# Patient Record
Sex: FEMALE | Race: WHITE | NOT HISPANIC OR LATINO | Employment: OTHER | URBAN - METROPOLITAN AREA
[De-identification: names, ages, dates, MRNs, and addresses within clinical notes are randomized per-mention and may not be internally consistent; named-entity substitution may affect disease eponyms.]

---

## 2017-07-31 DIAGNOSIS — I48.19 PERSISTENT ATRIAL FIBRILLATION (HCC): ICD-10-CM

## 2017-07-31 RX ORDER — APIXABAN 2.5 MG/1
TABLET, FILM COATED ORAL
Qty: 180 TABLET | Refills: 0 | Status: SHIPPED | OUTPATIENT
Start: 2017-07-31 | End: 2017-10-30 | Stop reason: SDUPTHER

## 2017-10-14 DIAGNOSIS — I48.19 PERSISTENT ATRIAL FIBRILLATION (HCC): ICD-10-CM

## 2017-10-16 RX ORDER — DRONEDARONE 400 MG/1
TABLET, FILM COATED ORAL
Qty: 180 TABLET | Refills: 2 | OUTPATIENT
Start: 2017-10-16

## 2017-10-18 DIAGNOSIS — I48.19 PERSISTENT ATRIAL FIBRILLATION (HCC): ICD-10-CM

## 2017-10-18 RX ORDER — DRONEDARONE 400 MG/1
TABLET, FILM COATED ORAL
Qty: 180 TABLET | Refills: 2 | OUTPATIENT
Start: 2017-10-18

## 2017-10-30 DIAGNOSIS — I48.19 PERSISTENT ATRIAL FIBRILLATION (HCC): ICD-10-CM

## 2017-10-30 RX ORDER — APIXABAN 2.5 MG/1
TABLET, FILM COATED ORAL
Qty: 90 TABLET | Refills: 0 | Status: SHIPPED | OUTPATIENT
Start: 2017-10-30 | End: 2017-12-14 | Stop reason: SDUPTHER

## 2017-12-14 DIAGNOSIS — I48.19 PERSISTENT ATRIAL FIBRILLATION (HCC): ICD-10-CM

## 2017-12-14 RX ORDER — APIXABAN 2.5 MG/1
TABLET, FILM COATED ORAL
Qty: 90 TABLET | Refills: 0 | Status: SHIPPED | OUTPATIENT
Start: 2017-12-14 | End: 2018-01-23

## 2018-01-23 ENCOUNTER — OFFICE VISIT (OUTPATIENT)
Dept: CARDIOLOGY | Facility: CLINIC | Age: 83
End: 2018-01-23

## 2018-01-23 VITALS
DIASTOLIC BLOOD PRESSURE: 92 MMHG | BODY MASS INDEX: 29.78 KG/M2 | SYSTOLIC BLOOD PRESSURE: 176 MMHG | HEART RATE: 83 BPM | WEIGHT: 189.7 LBS | HEIGHT: 67 IN

## 2018-01-23 DIAGNOSIS — I10 ESSENTIAL HYPERTENSION: ICD-10-CM

## 2018-01-23 DIAGNOSIS — I48.19 PERSISTENT ATRIAL FIBRILLATION (HCC): Primary | ICD-10-CM

## 2018-01-23 PROCEDURE — 99213 OFFICE O/P EST LOW 20 MIN: CPT | Performed by: INTERNAL MEDICINE

## 2018-01-23 PROCEDURE — 93000 ELECTROCARDIOGRAM COMPLETE: CPT | Performed by: INTERNAL MEDICINE

## 2018-01-23 RX ORDER — METOPROLOL TARTRATE 50 MG/1
50 TABLET, FILM COATED ORAL EVERY 12 HOURS SCHEDULED
Qty: 180 TABLET | Refills: 3 | Status: SHIPPED | OUTPATIENT
Start: 2018-01-23 | End: 2022-12-07 | Stop reason: HOSPADM

## 2018-01-23 NOTE — PROGRESS NOTES
[facility]  Office visit  Ranjana Hatfield  9/25/1933  [phone]    VISIT DATE:  01/23/2018    PCP: Alpesh Perez, DO  [address] [facility] [address]    CC:  Chief Complaint   Patient presents with   • Atrial Fibrillation       PROBLEM LIST:  1. Paroxysmal atrial fibrillation:  a. CHADS2 equal to 2. CHADS-VASc=3.  b. NITHYA discloses normal LV function with LV hypertrophy, LVEF 60%, mild aortic regurgitation, mild to moderate MR, trivial small PFO.  c. Successful synchronized ECV using biphasic energy, 02/13/2007.  d. Electrical cardioversion, 06/17/2013:  i. Initiated antiarrhythmic therapy, Multaq.  ii. Initiated prophylactic anticoagulation therapy, Xarelto.  iii. Xarelto discontinued secondary to anemia, 01/10/2015.   iv. Eliquis initiated 05/19/2015.   e. Nuclear stress test shows no evidence of ischemia/infarction, LVEF 73%.   f. ECV 8/1/2016 (Dr Fisher), unsuccessful after 2 shocks, Follow-up EKG in November 2016 revealed a wandering atrial pacemaker.  g. EKG January 2018: Rate controlled atrial fibrillation  2. Hypertension.  3. Pulmonary emboli, 2010:  a. Warfarin therapy- dc'd when Eliquis initiated  b. IVC filter, data deficit.   4. Hypothyroidism.  5. Anemia of chronic disease:  a. Secondary to chronic renal insufficiency.  6. Internal hemorrhoids.  7. Mild chronic renal insufficiency.  8. Status post surgery remote:                                                                     a. Lower back surgery (data deficit), 2010.                                                                    b. Right cataract extraction and lens implantation (December 2015).     ASSESSMENT:   Diagnosis Plan   1. Persistent atrial fibrillation  Holter Monitor - 24 Hour   2. Essential hypertension         PLAN:  Atrial fibrillation: It appears she has made the transition to permanent asymptomatic atrial fibrillation.  Discontinuing multaq.  Charting metoprolol tartrate 50 mg  "by mouth twice a day.  Follow-up Holter monitor in one to 2 weeks to verify adequate rate control.  Continue Eliquis for stroke prophylaxis, 5 mg by mouth twice a day.    Hypertension: Ideal goal less than 130/80 mmHg.  Continue current medical therapy.    Subjective  Reports stable functional capacity.  Using a cane for ambulation.  She denies chest pain, palpitations or limiting dyspnea on exertion.  Blood pressures at home are running in the 140-145 over less than 80 mmHg range.  She is compliant with medical therapy.  Denies easy bruising or bleeding complications on Eliquis.  Compliant with medical therapy.  Twelve-lead EKG revealed rate controlled atrial fibrillation in the office today.    PHYSICAL EXAMINATION:  Vitals:    01/23/18 1015   BP: 176/92   BP Location: Left arm   Patient Position: Sitting   Pulse: 83   Weight: 86 kg (189 lb 11.2 oz)   Height: 170.2 cm (67\")     General Appearance:    Alert, cooperative, no distress, appears stated age   Head:    Normocephalic, without obvious abnormality, atraumatic   Eyes:    conjunctiva/corneas clear   Nose:   Nares normal, septum midline, mucosa normal, no drainage   Throat:   Lips, teeth and gums normal   Neck:   Supple, symmetrical, trachea midline, no carotid    bruit or JVD   Lungs:     Clear to auscultation bilaterally, respirations unlabored   Chest Wall:    No tenderness or deformity    Heart:    Irregularly irregular, S1 and S2 normal, no murmur, rub   or gallop, normal carotid impulse bilaterally without bruit.   Abdomen:     Soft, non-tender   Extremities:   Extremities normal, atraumatic, no cyanosis or edema   Pulses:   2+ and symmetric all extremities   Skin:   Skin color, texture, turgor normal, no rashes or lesions       Diagnostic Data:    ECG 12 Lead  Date/Time: 1/23/2018 10:26 AM  Performed by: MARYCHUY BROOKS III  Authorized by: MARYCHUY BROOKS III   Comparison: compared with previous ECG   Comparison to previous ECG: Atrial fibrillation has " replaced wandering atrial pacemaker  Rhythm: atrial fibrillation  Clinical impression: abnormal ECG          Lab Results   Component Value Date    TRIG 67 08/01/2016    HDL 61 (H) 08/01/2016    LDLDIRECT 81 08/01/2016     Lab Results   Component Value Date    GLUCOSE 90 08/01/2016    BUN 15 08/01/2016    CREATININE 1.20 08/01/2016     08/01/2016    K 4.1 08/01/2016     08/01/2016    CO2 28.0 08/01/2016     No results found for: HGBA1C  Lab Results   Component Value Date    WBC 4.09 08/01/2016    HGB 12.1 08/01/2016    HCT 37.7 08/01/2016     08/01/2016       Allergies  Allergies   Allergen Reactions   • Lipitor [Atorvastatin]      myalgias   • Nsaids      gastritis   • Xarelto [Rivaroxaban]      anemia       Current Medications    Current Outpatient Prescriptions:   •  cholecalciferol (VITAMIN D3) 1000 UNITS tablet, Take 1,000 Units by mouth daily., Disp: , Rfl:   •  Ferrous Sulfate (SLOW FE PO), Take 1 tablet by mouth daily., Disp: , Rfl:   •  furosemide (LASIX) 20 MG tablet, Take 20 mg by mouth daily., Disp: , Rfl:   •  gabapentin (NEURONTIN) 100 MG capsule, Take 100 mg by mouth 3 (three) times a day., Disp: , Rfl:   •  levothyroxine (SYNTHROID, LEVOTHROID) 25 MCG tablet, Take 50 mcg by mouth Daily., Disp: , Rfl:   •  losartan (COZAAR) 100 MG tablet, Take 100 mg by mouth daily., Disp: , Rfl:   •  apixaban (ELIQUIS) 5 MG tablet tablet, Take 1 tablet by mouth Every 12 (Twelve) Hours., Disp: 60 tablet, Rfl: 5  •  metoprolol tartrate (LOPRESSOR) 50 MG tablet, Take 1 tablet by mouth Every 12 (Twelve) Hours., Disp: 180 tablet, Rfl: 3          ROS  Review of Systems   Cardiovascular: Negative for chest pain, dyspnea on exertion, irregular heartbeat and palpitations.   Respiratory: Negative for cough and shortness of breath.        SOCIAL HX  Social History     Social History   • Marital status:      Spouse name: N/A   • Number of children: N/A   • Years of education: N/A     Occupational History    • Not on file.     Social History Main Topics   • Smoking status: Never Smoker   • Smokeless tobacco: Never Used   • Alcohol use No   • Drug use: No   • Sexual activity: Defer     Other Topics Concern   • Not on file     Social History Narrative       FAMILY HX  Family History   Problem Relation Age of Onset   • Stroke Mother    • Emphysema Father              Donny Schmitz III, MD, FACC

## 2018-01-26 DIAGNOSIS — I48.19 PERSISTENT ATRIAL FIBRILLATION (HCC): ICD-10-CM

## 2018-01-26 RX ORDER — APIXABAN 2.5 MG/1
TABLET, FILM COATED ORAL
Qty: 90 TABLET | Refills: 0 | OUTPATIENT
Start: 2018-01-26

## 2018-07-20 RX ORDER — APIXABAN 5 MG/1
TABLET, FILM COATED ORAL
Qty: 60 TABLET | Refills: 4 | Status: SHIPPED | OUTPATIENT
Start: 2018-07-20 | End: 2018-09-25

## 2018-09-12 ENCOUNTER — OFFICE VISIT (OUTPATIENT)
Dept: CARDIAC SURGERY | Facility: CLINIC | Age: 83
End: 2018-09-12

## 2018-09-12 VITALS
SYSTOLIC BLOOD PRESSURE: 160 MMHG | HEIGHT: 67 IN | OXYGEN SATURATION: 96 % | DIASTOLIC BLOOD PRESSURE: 80 MMHG | WEIGHT: 175 LBS | BODY MASS INDEX: 27.47 KG/M2 | HEART RATE: 69 BPM

## 2018-09-12 DIAGNOSIS — R59.0 MEDIASTINAL LYMPHADENOPATHY: Primary | ICD-10-CM

## 2018-09-12 PROCEDURE — 99205 OFFICE O/P NEW HI 60 MIN: CPT | Performed by: THORACIC SURGERY (CARDIOTHORACIC VASCULAR SURGERY)

## 2018-09-12 NOTE — PROGRESS NOTES
09/12/2018  Patient Information  Ranjana Hatfield                                                                                          [address] [facility] [place] [address]   9/25/1933  'PCP/Referring Physician'  Alpesh Perez,   [phone]  Caroline Hernandes*  [phone]  Chief Complaint   Patient presents with   • Consult     NP per Dr. Hernandes for Mediastinal Enlarged Lymph Nodes        History of Present Illness:   The patient is an 84-year-old female who is referred at this time for evaluation of mediastinal lymphadenopathy.  The patient recently had a lumpectomy done in the right breast approximately a week ago by Dr. Brunner.  Patient is seeing Dr. Hernandes.  She had a PET scan which reveals a SUV of 4.8 in the mediastinal lymph node area.  The patient has been referred for evaluation of this.  She denies any specific weight loss or other problems.  Patient Active Problem List   Diagnosis   • Chronic renal insufficiency   • Anemia of chronic disease   • Hypothyroidism   • Essential hypertension   • Persistent atrial fibrillation (CMS/HCC)   • VTE (venous thromboembolism)     Past Medical History:   Diagnosis Date   • Arrhythmia    • Chronic kidney disease    • Disease of thyroid gland    • GI bleed    • History of blood transfusion    • Hyperlipidemia    • Hypertension    • Internal hemorrhoids    • Pulmonary emboli (CMS/HCC) 2010    Warfarin therapy.IVC filter, data deficit. Warfarin discontinued.     Past Surgical History:   Procedure Laterality Date   • BACK SURGERY  2010    lower back  data-deficit   • CATARACT EXTRACTION W/ INTRAOCULAR LENS IMPLANT  12/2015    right        Current Outpatient Prescriptions:   •  cholecalciferol (VITAMIN D3) 1000 UNITS tablet, Take 1,000 Units by mouth daily., Disp: , Rfl:   •  ELIQUIS 5 MG tablet tablet, TAKE ONE TABLET BY MOUTH EVERY 12 HOURS, Disp: 60 tablet, Rfl: 4  •  Ferrous Sulfate (SLOW FE PO), Take 1 tablet by mouth daily., Disp: , Rfl:   •   furosemide (LASIX) 20 MG tablet, Take 20 mg by mouth daily., Disp: , Rfl:   •  gabapentin (NEURONTIN) 100 MG capsule, Take 100 mg by mouth 3 (three) times a day., Disp: , Rfl:   •  levothyroxine (SYNTHROID, LEVOTHROID) 25 MCG tablet, Take 50 mcg by mouth Daily., Disp: , Rfl:   •  losartan (COZAAR) 100 MG tablet, Take 100 mg by mouth daily., Disp: , Rfl:   •  metoprolol tartrate (LOPRESSOR) 50 MG tablet, Take 1 tablet by mouth Every 12 (Twelve) Hours., Disp: 180 tablet, Rfl: 3  Allergies   Allergen Reactions   • Lipitor [Atorvastatin] Unknown (See Comments)     myalgias   • Nsaids Unknown (See Comments)     gastritis   • Xarelto [Rivaroxaban] Unknown (See Comments)     anemia     Social History     Social History   • Marital status:      Spouse name: N/A   • Number of children: 5   • Years of education: N/A     Occupational History   • disabled      Social History Main Topics   • Smoking status: Never Smoker   • Smokeless tobacco: Never Used   • Alcohol use No   • Drug use: No   • Sexual activity: Defer     Other Topics Concern   • Not on file     Social History Narrative    Lives with son in [place], [place]      Family History   Problem Relation Age of Onset   • Stroke Mother    • Emphysema Father      Review of Systems   Constitution: Positive for malaise/fatigue. Negative for chills, fever, night sweats and weight loss.   HENT: Negative for hearing loss, odynophagia and sore throat.    Cardiovascular: Positive for dyspnea on exertion and palpitations. Negative for chest pain, leg swelling and orthopnea.   Respiratory: Negative for cough and hemoptysis.    Endocrine: Negative for cold intolerance, heat intolerance, polydipsia, polyphagia and polyuria.   Hematologic/Lymphatic: Bruises/bleeds easily.   Skin: Negative for itching and rash.   Musculoskeletal: Negative for joint pain, joint swelling and myalgias.   Gastrointestinal: Positive for constipation. Negative for abdominal pain, diarrhea, hematemesis,  "hematochezia, melena, nausea and vomiting.   Genitourinary: Positive for nocturia. Negative for dysuria, frequency and hematuria.   Neurological: Negative for focal weakness, headaches, numbness and seizures.   Psychiatric/Behavioral: Negative for suicidal ideas.   Allergic/Immunologic: Positive for environmental allergies.   All other systems reviewed and are negative.    Vitals:    09/12/18 1017   BP: 160/80   BP Location: Right arm   Patient Position: Sitting   Pulse: 69   SpO2: 96%   Weight: 79.4 kg (175 lb)   Height: 170.2 cm (67\")      Physical Exam   Constitutional: She is oriented to person, place, and time. She appears well-developed and well-nourished. No distress.   HENT:   Head: Normocephalic.   Eyes: Pupils are equal, round, and reactive to light. EOM are normal.   Neck: Normal range of motion. Carotid bruit is not present. No thyromegaly present.   Cardiovascular: Normal rate and regular rhythm.  Exam reveals no gallop and no friction rub.    No murmur heard.  Pulmonary/Chest: She has no wheezes. She has no rales.   Abdominal: Soft. Bowel sounds are normal. She exhibits no distension and no mass. There is no hepatomegaly. There is no tenderness.   Musculoskeletal: Normal range of motion. She exhibits tenderness. She exhibits no deformity.   Neurological: She is alert and oriented to person, place, and time. She has normal strength. No cranial nerve deficit or sensory deficit.   Skin: No bruising and no petechiae noted. No cyanosis. Nails show no clubbing.   Psychiatric: She has a normal mood and affect.     Labs/Imaging:  I obtained and reviewed medical records from Dr. Hernandes including the PET scan demonstrating an SUV of 4.8 in the mediastinal area where the lymph nodes are.     Assessment/Plan:   Ms. Hatfield is an 84-year-old female who is referred for mediastinal adenopathy.  I have reviewed the PET scan and I concur with Dr. Hernandes concern about this.  I think that the EBUS would be the procedure of " choice.  I have discussed that with both her and her daughter and answered all of her questions in detail.  They appeared to be fully informed and agreeable to proceed.  Patient Active Problem List   Diagnosis   • Chronic renal insufficiency   • Anemia of chronic disease   • Hypothyroidism   • Essential hypertension   • Persistent atrial fibrillation (CMS/HCC)   • VTE (venous thromboembolism)     CC: MD Alpesh Olea DO Debbie Moore, , editing for Manas Casanova M.D.    I, Manas Casanova MD, have read and agree with the editing done by Erika Larry, .

## 2018-09-13 DIAGNOSIS — R59.0 MEDIASTINAL LYMPHADENOPATHY: Primary | ICD-10-CM

## 2018-09-14 DIAGNOSIS — R59.0 MEDIASTINAL LYMPHADENOPATHY: Primary | ICD-10-CM

## 2018-09-17 ENCOUNTER — TELEPHONE (OUTPATIENT)
Dept: CARDIAC SURGERY | Facility: CLINIC | Age: 83
End: 2018-09-17

## 2018-09-17 ENCOUNTER — TELEPHONE (OUTPATIENT)
Dept: CARDIOLOGY | Facility: CLINIC | Age: 83
End: 2018-09-17

## 2018-09-17 NOTE — TELEPHONE ENCOUNTER
Oriana at Dr.Dennis Dorman office called requesting permission to stop Eliquis 2 days prior to [place] procedure scheduled on 9/26/18. Please advise.

## 2018-09-17 NOTE — TELEPHONE ENCOUNTER
Called  office. Spoke to Natalee. Message in Epic to relay to Oriana. She verbalized understanding.

## 2018-09-17 NOTE — TELEPHONE ENCOUNTER
Left message for Stephanie at Dr. Schmitz's office to obtain permission to stop blood thinner 2 days prior to procedure w/Dr. Dorman

## 2018-09-20 ENCOUNTER — PREP FOR SURGERY (OUTPATIENT)
Dept: OTHER | Facility: HOSPITAL | Age: 83
End: 2018-09-20

## 2018-09-20 DIAGNOSIS — R59.0 MEDIASTINAL LYMPHADENOPATHY: Primary | ICD-10-CM

## 2018-09-25 ENCOUNTER — APPOINTMENT (OUTPATIENT)
Dept: PREADMISSION TESTING | Facility: HOSPITAL | Age: 83
End: 2018-09-25

## 2018-09-25 ENCOUNTER — HOSPITAL ENCOUNTER (OUTPATIENT)
Dept: GENERAL RADIOLOGY | Facility: HOSPITAL | Age: 83
Discharge: HOME | End: 2018-09-25
Admitting: PHYSICIAN ASSISTANT

## 2018-09-25 VITALS — BODY MASS INDEX: 28.28 KG/M2 | HEIGHT: 67 IN | WEIGHT: 180.2 LBS

## 2018-09-25 DIAGNOSIS — R59.0 MEDIASTINAL LYMPHADENOPATHY: ICD-10-CM

## 2018-09-25 LAB
ALBUMIN SERPL-MCNC: 4.12 G/DL (ref 3.2–4.8)
ALBUMIN/GLOB SERPL: 1.7 G/DL (ref 1.5–2.5)
ALP SERPL-CCNC: 77 U/L (ref 25–100)
ALT SERPL W P-5'-P-CCNC: 6 U/L (ref 7–40)
ANION GAP SERPL CALCULATED.3IONS-SCNC: 6 MMOL/L (ref 3–11)
APTT PPP: 35 SECONDS (ref 24–31)
AST SERPL-CCNC: 19 U/L (ref 0–33)
BILIRUB SERPL-MCNC: 0.7 MG/DL (ref 0.3–1.2)
BUN BLD-MCNC: 19 MG/DL (ref 9–23)
BUN/CREAT SERPL: 17.8 (ref 7–25)
CALCIUM SPEC-SCNC: 9.1 MG/DL (ref 8.7–10.4)
CHLORIDE SERPL-SCNC: 104 MMOL/L (ref 99–109)
CO2 SERPL-SCNC: 29 MMOL/L (ref 20–31)
CREAT BLD-MCNC: 1.07 MG/DL (ref 0.6–1.3)
DEPRECATED RDW RBC AUTO: 45.5 FL (ref 37–54)
ERYTHROCYTE [DISTWIDTH] IN BLOOD BY AUTOMATED COUNT: 14 % (ref 11.3–14.5)
GFR SERPL CREATININE-BSD FRML MDRD: 49 ML/MIN/1.73
GLOBULIN UR ELPH-MCNC: 2.5 GM/DL
GLUCOSE BLD-MCNC: 79 MG/DL (ref 70–100)
HBA1C MFR BLD: 6.1 % (ref 4.8–5.6)
HCT VFR BLD AUTO: 40.2 % (ref 34.5–44)
HGB BLD-MCNC: 12.7 G/DL (ref 11.5–15.5)
INR PPP: 1.09 (ref 0.91–1.09)
MCH RBC QN AUTO: 28 PG (ref 27–31)
MCHC RBC AUTO-ENTMCNC: 31.6 G/DL (ref 32–36)
MCV RBC AUTO: 88.5 FL (ref 80–99)
PLATELET # BLD AUTO: 221 10*3/MM3 (ref 150–450)
PMV BLD AUTO: 12.3 FL (ref 6–12)
POTASSIUM BLD-SCNC: 3.9 MMOL/L (ref 3.5–5.5)
PROT SERPL-MCNC: 6.6 G/DL (ref 5.7–8.2)
PROTHROMBIN TIME: 11.4 SECONDS (ref 9.6–11.5)
RBC # BLD AUTO: 4.54 10*6/MM3 (ref 3.89–5.14)
SODIUM BLD-SCNC: 139 MMOL/L (ref 132–146)
WBC NRBC COR # BLD: 5.54 10*3/MM3 (ref 3.5–10.8)

## 2018-09-25 PROCEDURE — 71046 X-RAY EXAM CHEST 2 VIEWS: CPT

## 2018-09-25 PROCEDURE — 83036 HEMOGLOBIN GLYCOSYLATED A1C: CPT | Performed by: PHYSICIAN ASSISTANT

## 2018-09-25 PROCEDURE — 85610 PROTHROMBIN TIME: CPT | Performed by: PHYSICIAN ASSISTANT

## 2018-09-25 PROCEDURE — 36415 COLL VENOUS BLD VENIPUNCTURE: CPT

## 2018-09-25 PROCEDURE — 93010 ELECTROCARDIOGRAM REPORT: CPT | Performed by: INTERNAL MEDICINE

## 2018-09-25 PROCEDURE — 85730 THROMBOPLASTIN TIME PARTIAL: CPT | Performed by: PHYSICIAN ASSISTANT

## 2018-09-25 PROCEDURE — 80053 COMPREHEN METABOLIC PANEL: CPT | Performed by: PHYSICIAN ASSISTANT

## 2018-09-25 PROCEDURE — 93005 ELECTROCARDIOGRAM TRACING: CPT

## 2018-09-25 PROCEDURE — 85027 COMPLETE CBC AUTOMATED: CPT | Performed by: PHYSICIAN ASSISTANT

## 2018-09-25 RX ORDER — AMLODIPINE BESYLATE 2.5 MG/1
5 TABLET ORAL EVERY MORNING
COMMUNITY

## 2018-09-26 ENCOUNTER — ANESTHESIA (OUTPATIENT)
Dept: GASTROENTEROLOGY | Facility: HOSPITAL | Age: 83
End: 2018-09-26

## 2018-09-26 ENCOUNTER — APPOINTMENT (OUTPATIENT)
Dept: GENERAL RADIOLOGY | Facility: HOSPITAL | Age: 83
End: 2018-09-26
Attending: THORACIC SURGERY (CARDIOTHORACIC VASCULAR SURGERY)

## 2018-09-26 ENCOUNTER — ANESTHESIA EVENT (OUTPATIENT)
Dept: GASTROENTEROLOGY | Facility: HOSPITAL | Age: 83
End: 2018-09-26

## 2018-09-26 ENCOUNTER — HOSPITAL ENCOUNTER (OUTPATIENT)
Facility: HOSPITAL | Age: 83
Setting detail: SURGERY ADMIT
Discharge: HOME | End: 2018-09-26
Attending: THORACIC SURGERY (CARDIOTHORACIC VASCULAR SURGERY) | Admitting: THORACIC SURGERY (CARDIOTHORACIC VASCULAR SURGERY)

## 2018-09-26 VITALS
WEIGHT: 170 LBS | SYSTOLIC BLOOD PRESSURE: 155 MMHG | TEMPERATURE: 97 F | BODY MASS INDEX: 26.68 KG/M2 | HEART RATE: 65 BPM | HEIGHT: 67 IN | OXYGEN SATURATION: 96 % | RESPIRATION RATE: 20 BRPM | DIASTOLIC BLOOD PRESSURE: 97 MMHG

## 2018-09-26 DIAGNOSIS — R59.0 MEDIASTINAL LYMPHADENOPATHY: ICD-10-CM

## 2018-09-26 PROCEDURE — 88341 IMHCHEM/IMCYTCHM EA ADD ANTB: CPT | Performed by: THORACIC SURGERY (CARDIOTHORACIC VASCULAR SURGERY)

## 2018-09-26 PROCEDURE — 25010000002 PROPOFOL 10 MG/ML EMULSION: Performed by: NURSE ANESTHETIST, CERTIFIED REGISTERED

## 2018-09-26 PROCEDURE — C1726 CATH, BAL DIL, NON-VASCULAR: HCPCS | Performed by: THORACIC SURGERY (CARDIOTHORACIC VASCULAR SURGERY)

## 2018-09-26 PROCEDURE — 71045 X-RAY EXAM CHEST 1 VIEW: CPT

## 2018-09-26 PROCEDURE — 31652 BRONCH EBUS SAMPLNG 1/2 NODE: CPT | Performed by: THORACIC SURGERY (CARDIOTHORACIC VASCULAR SURGERY)

## 2018-09-26 PROCEDURE — 88342 IMHCHEM/IMCYTCHM 1ST ANTB: CPT | Performed by: THORACIC SURGERY (CARDIOTHORACIC VASCULAR SURGERY)

## 2018-09-26 PROCEDURE — 25010000002 ONDANSETRON PER 1 MG: Performed by: NURSE ANESTHETIST, CERTIFIED REGISTERED

## 2018-09-26 PROCEDURE — 25010000002 SUCCINYLCHOLINE PER 20 MG: Performed by: NURSE ANESTHETIST, CERTIFIED REGISTERED

## 2018-09-26 PROCEDURE — S0260 H&P FOR SURGERY: HCPCS | Performed by: THORACIC SURGERY (CARDIOTHORACIC VASCULAR SURGERY)

## 2018-09-26 PROCEDURE — 25010000002 DEXAMETHASONE PER 1 MG: Performed by: NURSE ANESTHETIST, CERTIFIED REGISTERED

## 2018-09-26 PROCEDURE — 88305 TISSUE EXAM BY PATHOLOGIST: CPT | Performed by: THORACIC SURGERY (CARDIOTHORACIC VASCULAR SURGERY)

## 2018-09-26 RX ORDER — LIDOCAINE HYDROCHLORIDE 10 MG/ML
INJECTION, SOLUTION EPIDURAL; INFILTRATION; INTRACAUDAL; PERINEURAL AS NEEDED
Status: DISCONTINUED | OUTPATIENT
Start: 2018-09-26 | End: 2018-09-26 | Stop reason: SURG

## 2018-09-26 RX ORDER — DEXAMETHASONE SODIUM PHOSPHATE 4 MG/ML
INJECTION, SOLUTION INTRA-ARTICULAR; INTRALESIONAL; INTRAMUSCULAR; INTRAVENOUS; SOFT TISSUE AS NEEDED
Status: DISCONTINUED | OUTPATIENT
Start: 2018-09-26 | End: 2018-09-26 | Stop reason: SURG

## 2018-09-26 RX ORDER — SODIUM CHLORIDE, SODIUM LACTATE, POTASSIUM CHLORIDE, CALCIUM CHLORIDE 600; 310; 30; 20 MG/100ML; MG/100ML; MG/100ML; MG/100ML
9 INJECTION, SOLUTION INTRAVENOUS CONTINUOUS
Status: DISCONTINUED | OUTPATIENT
Start: 2018-09-26 | End: 2018-09-26 | Stop reason: HOSPADM

## 2018-09-26 RX ORDER — LIDOCAINE HYDROCHLORIDE 40 MG/ML
SOLUTION TOPICAL AS NEEDED
Status: DISCONTINUED | OUTPATIENT
Start: 2018-09-26 | End: 2018-09-26 | Stop reason: SURG

## 2018-09-26 RX ORDER — MEPERIDINE HYDROCHLORIDE 25 MG/ML
12.5 INJECTION INTRAMUSCULAR; INTRAVENOUS; SUBCUTANEOUS
Status: DISCONTINUED | OUTPATIENT
Start: 2018-09-26 | End: 2018-09-26 | Stop reason: HOSPADM

## 2018-09-26 RX ORDER — SUCCINYLCHOLINE CHLORIDE 20 MG/ML
INJECTION INTRAMUSCULAR; INTRAVENOUS AS NEEDED
Status: DISCONTINUED | OUTPATIENT
Start: 2018-09-26 | End: 2018-09-26 | Stop reason: SURG

## 2018-09-26 RX ORDER — FAMOTIDINE 20 MG/1
20 TABLET, FILM COATED ORAL ONCE
Status: DISCONTINUED | OUTPATIENT
Start: 2018-09-26 | End: 2018-09-26 | Stop reason: HOSPADM

## 2018-09-26 RX ORDER — SODIUM CHLORIDE 0.9 % (FLUSH) 0.9 %
1-10 SYRINGE (ML) INJECTION AS NEEDED
Status: DISCONTINUED | OUTPATIENT
Start: 2018-09-26 | End: 2018-09-26 | Stop reason: HOSPADM

## 2018-09-26 RX ORDER — PROPOFOL 10 MG/ML
VIAL (ML) INTRAVENOUS AS NEEDED
Status: DISCONTINUED | OUTPATIENT
Start: 2018-09-26 | End: 2018-09-26 | Stop reason: SURG

## 2018-09-26 RX ORDER — ACETAMINOPHEN 325 MG/1
650 TABLET ORAL ONCE AS NEEDED
Status: DISCONTINUED | OUTPATIENT
Start: 2018-09-26 | End: 2018-09-26 | Stop reason: HOSPADM

## 2018-09-26 RX ORDER — FAMOTIDINE 10 MG/ML
20 INJECTION, SOLUTION INTRAVENOUS ONCE
Status: DISCONTINUED | OUTPATIENT
Start: 2018-09-26 | End: 2018-09-26 | Stop reason: HOSPADM

## 2018-09-26 RX ORDER — SODIUM CHLORIDE 9 MG/ML
50 INJECTION, SOLUTION INTRAVENOUS CONTINUOUS
Status: DISCONTINUED | OUTPATIENT
Start: 2018-09-26 | End: 2018-09-26 | Stop reason: HOSPADM

## 2018-09-26 RX ORDER — ONDANSETRON 2 MG/ML
INJECTION INTRAMUSCULAR; INTRAVENOUS AS NEEDED
Status: DISCONTINUED | OUTPATIENT
Start: 2018-09-26 | End: 2018-09-26 | Stop reason: SURG

## 2018-09-26 RX ORDER — IPRATROPIUM BROMIDE AND ALBUTEROL SULFATE 2.5; .5 MG/3ML; MG/3ML
3 SOLUTION RESPIRATORY (INHALATION) ONCE AS NEEDED
Status: DISCONTINUED | OUTPATIENT
Start: 2018-09-26 | End: 2018-09-26 | Stop reason: HOSPADM

## 2018-09-26 RX ORDER — LIDOCAINE HYDROCHLORIDE 10 MG/ML
0.5 INJECTION, SOLUTION EPIDURAL; INFILTRATION; INTRACAUDAL; PERINEURAL ONCE AS NEEDED
Status: DISCONTINUED | OUTPATIENT
Start: 2018-09-26 | End: 2018-09-26 | Stop reason: HOSPADM

## 2018-09-26 RX ADMIN — SUCCINYLCHOLINE CHLORIDE 120 MG: 20 INJECTION, SOLUTION INTRAMUSCULAR; INTRAVENOUS at 09:09

## 2018-09-26 RX ADMIN — ONDANSETRON 4 MG: 2 INJECTION INTRAMUSCULAR; INTRAVENOUS at 09:28

## 2018-09-26 RX ADMIN — EPHEDRINE SULFATE 10 MG: 50 INJECTION INTRAMUSCULAR; INTRAVENOUS; SUBCUTANEOUS at 09:34

## 2018-09-26 RX ADMIN — LIDOCAINE HYDROCHLORIDE 50 MG: 10 INJECTION, SOLUTION EPIDURAL; INFILTRATION; INTRACAUDAL; PERINEURAL at 09:09

## 2018-09-26 RX ADMIN — SODIUM CHLORIDE, POTASSIUM CHLORIDE, SODIUM LACTATE AND CALCIUM CHLORIDE: 600; 310; 30; 20 INJECTION, SOLUTION INTRAVENOUS at 09:07

## 2018-09-26 RX ADMIN — LIDOCAINE HYDROCHLORIDE 1 EACH: 40 SOLUTION TOPICAL at 09:10

## 2018-09-26 RX ADMIN — DEXAMETHASONE SODIUM PHOSPHATE 8 MG: 4 INJECTION, SOLUTION INTRAMUSCULAR; INTRAVENOUS at 09:18

## 2018-09-26 RX ADMIN — SODIUM CHLORIDE 50 ML/HR: 9 INJECTION, SOLUTION INTRAVENOUS at 08:39

## 2018-09-26 RX ADMIN — PROPOFOL 150 MG: 10 INJECTION, EMULSION INTRAVENOUS at 09:09

## 2018-09-26 NOTE — ANESTHESIA PREPROCEDURE EVALUATION
Anesthesia Evaluation     NPO Solid Status: > 8 hours  NPO Liquid Status: > 8 hours           Airway   Mallampati: II  TM distance: >3 FB  Neck ROM: full  No difficulty expected  Dental    (+) poor dentition        Pulmonary    (+) decreased breath sounds,   (-) asthma, not a smoker  Cardiovascular     ECG reviewed  Rhythm: regular  Rate: normal    (+) dysrhythmias Atrial Fib,   (-) angina, murmur, cardiac stents, CABG      Neuro/Psych  (-) seizures, TIA, CVA  GI/Hepatic/Renal/Endo    (-) liver disease, no renal disease, diabetes    Musculoskeletal     Abdominal    Substance History      OB/GYN          Other                        Anesthesia Plan    ASA 3     general     intravenous induction     Plan discussed with CRNA.

## 2018-09-26 NOTE — ANESTHESIA PROCEDURE NOTES
Airway  Urgency: elective    Date/Time: 9/26/2018 9:09 AM  End Time:9/26/2018 9:10 AM  Airway not difficult    General Information and Staff    Patient location during procedure: OR  CRNA: CHRISS DE LEON    Indications and Patient Condition  Indications for airway management: airway protection    Preoxygenated: yes  MILS not maintained throughout  Mask difficulty assessment: 1 - vent by mask    Final Airway Details  Final airway type: endotracheal airway      Successful airway: ETT  Cuffed: yes   Successful intubation technique: direct laryngoscopy  Endotracheal tube insertion site: oral  Blade: Pernell  Blade size: 3  ETT size: 8.5 mm  Cormack-Lehane Classification: grade I - full view of glottis  Placement verified by: chest auscultation and capnometry   Measured from: lips  ETT to lips (cm): 21  Number of attempts at approach: 1    Additional Comments  Negative epigastric sounds, Breath sound equal bilaterally with symmetric chest rise and fall

## 2018-09-26 NOTE — ANESTHESIA POSTPROCEDURE EVALUATION
Patient: Ranjana Hatfield    Procedure Summary     Date:  09/26/18 Room / Location:   [facility] /  [facility]    Anesthesia Start:  0907 Anesthesia Stop:  0950    Procedure:  BRONCHOSCOPY WITH ENDOBRONCHIAL ULTRASOUND (N/A Bronchus) Diagnosis:       Mediastinal lymphadenopathy      (Mediastinal lymphadenopathy [R59.0])    Surgeon:  Salbador Dorman MD Provider:  Junior Jacob MD    Anesthesia Type:  general ASA Status:  3          Anesthesia Type: general  Last vitals  BP   171/90 (09/26/18 0813)   Temp   97.8 °F (36.6 °C) (09/26/18 0813)   Pulse   (!) 49 (09/26/18 0813)   Resp   14 (09/26/18 0813)     SpO2   96 % (09/26/18 0813)     Post Anesthesia Care and Evaluation    Patient location during evaluation: PACU  Patient participation: complete - patient participated  Level of consciousness: awake and alert  Pain score: 0  Pain management: adequate  Airway patency: patent  Anesthetic complications: No anesthetic complications  PONV Status: none  Cardiovascular status: hemodynamically stable and acceptable  Respiratory status: nonlabored ventilation, acceptable and nasal cannula  Hydration status: acceptable

## 2018-09-27 ENCOUNTER — TELEPHONE (OUTPATIENT)
Dept: CARDIAC SURGERY | Facility: CLINIC | Age: 83
End: 2018-09-27

## 2018-09-27 ENCOUNTER — NURSE TRIAGE (OUTPATIENT)
Dept: CALL CENTER | Facility: HOSPITAL | Age: 83
End: 2018-09-27

## 2018-09-27 VITALS — BODY MASS INDEX: 28.19 KG/M2 | WEIGHT: 180 LBS

## 2018-09-27 NOTE — TELEPHONE ENCOUNTER
Son is caller. Mother had surgery yesterday, she had a bronchoscopy, BRONCHOSCOPY WITH ENDOBRONCHIAL ULTRASOUND performed by Dr Dorman.  This procedure was done as an outpatient.  The caller went to visit his mother this afternoon and when he walked into the room, he immediately noticed his mother's neck was very swollen. Her neck is swollen all around, he can't say that is is twice the normal size but it is very swollen. Patient says swelling started after lunch today.  She had trouble sleeping last night because she was coughing. He wants to know if this is normal. The doctor told them she might have some coughing but nothing was said about neck swelling.  Patient denies any trouble breathing or swallowing, denies pain, neck is not discolored.  Son has tried to call the MD performing the procedure but the office is closed and MD not available for call. I called the surgeons office and MD is not available for patient call. I told caller neck swelling is not normal after a bronchoscopy and patient needs an evaluation, advised to be seen at the ER.  Caller says he will discuss advice with his mother.              Reason for Disposition  • [1] Caller has URGENT question AND [2] triager unable to answer question    Additional Information  • Negative: Sounds like a life-threatening emergency to the triager  • Negative: Chest pain  • Negative: Difficulty breathing  • Negative: Surgical incision symptoms and questions  • Negative: [1] Discomfort (pain, burning or stinging) when passing urine AND [2] male  • Negative: [1] Discomfort (pain, burning or stinging) when passing urine AND [2] female  • Negative: Constipation  • Negative: New or worsening leg (calf, thigh) pain  • Negative: New or worsening leg swelling  • Negative: Dizziness is severe, or persists > 24 hours after surgery  • Negative: Pain, redness, swelling, or pus at IV Site  • Negative: Symptoms arising from use of a urinary catheter (Hatfield or Coude)  •  "Negative: Cast problems or questions  • Negative: Medication question  • Negative: [1] Widespread rash AND [2] bright red, sunburn-like  • Negative: [1] SEVERE headache AND [2] after spinal (epidural) anesthesia  • Negative: [1] Vomiting AND [2] persists > 4 hours  • Negative: [1] Vomiting AND [2] abdomen looks much more swollen than usual  • Negative: [1] Drinking very little AND [2] dehydration suspected (e.g., no urine > 12 hours, very dry mouth, very lightheaded)  • Negative: Patient sounds very sick or weak to the triager  • Negative: Sounds like a serious complication to the triager  • Negative: Fever > 100.5 F (38.1 C)  • Negative: [1] SEVERE post-op pain (e.g., excruciating, pain scale 8-10) AND [2] not controlled with pain medications    Answer Assessment - Initial Assessment Questions  1. SYMPTOM: \"What's the main symptom you're concerned about?\" (e.g., pain, fever, vomiting)      Swelling in neck  . ONSET: \"When did ________  start?\"      After lunch today patient noticed it, couldn't sleep last night  3. SURGERY: \"What surgery was performed?\"      bronchoscopy  4. DATE of SURGERY: \"When was surgery performed?\"       09/26/2018  5. ANESTHESIA: \" What type of anesthesia did you have?\" (e.g., general, spinal, epidural, local)      general  6. PAIN: \"Is there any pain?\" If so, ask: \"How bad is it?\"  (Scale 1-10; or mild, moderate, severe)      She says it is not hurting  7. FEVER: \"Do you have a fever?\" If so, ask: \"What is your temperature, how was it measured, and when did it start?\"      No fever  8. VOMITING: \"Is there any vomiting?\" If yes, ask: \"How many times?\"      No vomiting  9. BLEEDING: \"Is there any bleeding?\" If so, ask: \"How much?\" and \"Where?\"      No bleeding  10. OTHER SYMPTOMS: \"Do you have any other symptoms?\" (e.g., drainage from wound, painful urination, constipation)        No incision    Protocols used: POST-OP SYMPTOMS AND QUESTIONS-ADULT-      "

## 2018-09-27 NOTE — TELEPHONE ENCOUNTER
Patient's daughter, Teresa called. She was concerned about her mother's throat being sore s/p EBUS. Teresa states that her mother's throat is sore, swollen and she is having trouble eating. I asked Dr. Dorman about this, he states that it is normal to feel that way after an EBUS, and that she should start feeling better in a couple of days. Teresa verbalized understanding.

## 2018-09-28 ENCOUNTER — ANESTHESIA EVENT (OUTPATIENT)
Dept: GASTROENTEROLOGY | Facility: HOSPITAL | Age: 83
End: 2018-09-28

## 2018-09-28 ENCOUNTER — HOSPITAL ENCOUNTER (INPATIENT)
Facility: HOSPITAL | Age: 83
LOS: 2 days | Discharge: HOME | End: 2018-09-30
Attending: FAMILY MEDICINE | Admitting: THORACIC SURGERY (CARDIOTHORACIC VASCULAR SURGERY)

## 2018-09-28 ENCOUNTER — APPOINTMENT (OUTPATIENT)
Dept: GENERAL RADIOLOGY | Facility: HOSPITAL | Age: 83
End: 2018-09-28

## 2018-09-28 ENCOUNTER — ANESTHESIA (OUTPATIENT)
Dept: GASTROENTEROLOGY | Facility: HOSPITAL | Age: 83
End: 2018-09-28

## 2018-09-28 ENCOUNTER — APPOINTMENT (OUTPATIENT)
Dept: CT IMAGING | Facility: HOSPITAL | Age: 83
End: 2018-09-28

## 2018-09-28 PROBLEM — C50.919 BREAST CANCER (HCC): Status: ACTIVE | Noted: 2018-09-28

## 2018-09-28 PROBLEM — J98.2 PNEUMOMEDIASTINUM (HCC): Status: ACTIVE | Noted: 2018-09-28

## 2018-09-28 PROBLEM — K66.8 PNEUMOPERITONEUM: Status: ACTIVE | Noted: 2018-09-28

## 2018-09-28 LAB
ALBUMIN SERPL-MCNC: 4.15 G/DL (ref 3.2–4.8)
ALBUMIN/GLOB SERPL: 1.6 G/DL (ref 1.5–2.5)
ALP SERPL-CCNC: 79 U/L (ref 25–100)
ALT SERPL W P-5'-P-CCNC: 11 U/L (ref 7–40)
ANION GAP SERPL CALCULATED.3IONS-SCNC: 11 MMOL/L (ref 3–11)
AST SERPL-CCNC: 21 U/L (ref 0–33)
BASOPHILS # BLD AUTO: 0 10*3/MM3 (ref 0–0.2)
BASOPHILS NFR BLD AUTO: 0 % (ref 0–1)
BILIRUB SERPL-MCNC: 1 MG/DL (ref 0.3–1.2)
BUN BLD-MCNC: 19 MG/DL (ref 9–23)
BUN/CREAT SERPL: 20.7 (ref 7–25)
CALCIUM SPEC-SCNC: 8.9 MG/DL (ref 8.7–10.4)
CHLORIDE SERPL-SCNC: 103 MMOL/L (ref 99–109)
CO2 SERPL-SCNC: 26 MMOL/L (ref 20–31)
CREAT BLD-MCNC: 0.92 MG/DL (ref 0.6–1.3)
CYTO UR: NORMAL
DEPRECATED RDW RBC AUTO: 45.6 FL (ref 37–54)
EOSINOPHIL # BLD AUTO: 0 10*3/MM3 (ref 0–0.3)
EOSINOPHIL NFR BLD AUTO: 0 % (ref 0–3)
ERYTHROCYTE [DISTWIDTH] IN BLOOD BY AUTOMATED COUNT: 14 % (ref 11.3–14.5)
GFR SERPL CREATININE-BSD FRML MDRD: 58 ML/MIN/1.73
GLOBULIN UR ELPH-MCNC: 2.7 GM/DL
GLUCOSE BLD-MCNC: 144 MG/DL (ref 70–100)
HCT VFR BLD AUTO: 40.6 % (ref 34.5–44)
HGB BLD-MCNC: 13 G/DL (ref 11.5–15.5)
IMM GRANULOCYTES # BLD: 0.01 10*3/MM3 (ref 0–0.03)
IMM GRANULOCYTES NFR BLD: 0.1 % (ref 0–0.6)
LAB AP CASE REPORT: NORMAL
LAB AP CLINICAL INFORMATION: NORMAL
LAB AP DIAGNOSIS COMMENT: NORMAL
LYMPHOCYTES # BLD AUTO: 0.55 10*3/MM3 (ref 0.6–4.8)
LYMPHOCYTES NFR BLD AUTO: 6.9 % (ref 24–44)
MCH RBC QN AUTO: 28.4 PG (ref 27–31)
MCHC RBC AUTO-ENTMCNC: 32 G/DL (ref 32–36)
MCV RBC AUTO: 88.6 FL (ref 80–99)
MONOCYTES # BLD AUTO: 0.12 10*3/MM3 (ref 0–1)
MONOCYTES NFR BLD AUTO: 1.5 % (ref 0–12)
NEUTROPHILS # BLD AUTO: 7.27 10*3/MM3 (ref 1.5–8.3)
NEUTROPHILS NFR BLD AUTO: 91.5 % (ref 41–71)
PATH REPORT.FINAL DX SPEC: NORMAL
PATH REPORT.GROSS SPEC: NORMAL
PLATELET # BLD AUTO: 188 10*3/MM3 (ref 150–450)
PMV BLD AUTO: 12.7 FL (ref 6–12)
POTASSIUM BLD-SCNC: 4.2 MMOL/L (ref 3.5–5.5)
PROT SERPL-MCNC: 6.8 G/DL (ref 5.7–8.2)
RBC # BLD AUTO: 4.58 10*6/MM3 (ref 3.89–5.14)
SODIUM BLD-SCNC: 140 MMOL/L (ref 132–146)
WBC NRBC COR # BLD: 7.95 10*3/MM3 (ref 3.5–10.8)

## 2018-09-28 PROCEDURE — 85025 COMPLETE CBC W/AUTO DIFF WBC: CPT | Performed by: NURSE PRACTITIONER

## 2018-09-28 PROCEDURE — 31622 DX BRONCHOSCOPE/WASH: CPT | Performed by: THORACIC SURGERY (CARDIOTHORACIC VASCULAR SURGERY)

## 2018-09-28 PROCEDURE — 25010000002 HYDRALAZINE PER 20 MG: Performed by: NURSE ANESTHETIST, CERTIFIED REGISTERED

## 2018-09-28 PROCEDURE — 71250 CT THORAX DX C-: CPT

## 2018-09-28 PROCEDURE — 25010000002 PROPOFOL 10 MG/ML EMULSION: Performed by: NURSE ANESTHETIST, CERTIFIED REGISTERED

## 2018-09-28 PROCEDURE — 74220 X-RAY XM ESOPHAGUS 1CNTRST: CPT

## 2018-09-28 PROCEDURE — 71045 X-RAY EXAM CHEST 1 VIEW: CPT

## 2018-09-28 PROCEDURE — 0BJ08ZZ INSPECTION OF TRACHEOBRONCHIAL TREE, VIA NATURAL OR ARTIFICIAL OPENING ENDOSCOPIC: ICD-10-PCS | Performed by: THORACIC SURGERY (CARDIOTHORACIC VASCULAR SURGERY)

## 2018-09-28 PROCEDURE — 99024 POSTOP FOLLOW-UP VISIT: CPT | Performed by: THORACIC SURGERY (CARDIOTHORACIC VASCULAR SURGERY)

## 2018-09-28 PROCEDURE — 80053 COMPREHEN METABOLIC PANEL: CPT | Performed by: NURSE PRACTITIONER

## 2018-09-28 PROCEDURE — 99223 1ST HOSP IP/OBS HIGH 75: CPT | Performed by: INTERNAL MEDICINE

## 2018-09-28 PROCEDURE — 0 DIATRIZOATE MEGLUMINE & SODIUM PER 1 ML: Performed by: INTERNAL MEDICINE

## 2018-09-28 RX ORDER — BENZONATATE 100 MG/1
200 CAPSULE ORAL 3 TIMES DAILY PRN
Status: DISCONTINUED | OUTPATIENT
Start: 2018-09-28 | End: 2018-09-30 | Stop reason: HOSPADM

## 2018-09-28 RX ORDER — AMLODIPINE BESYLATE 2.5 MG/1
2.5 TABLET ORAL EVERY MORNING
Status: DISCONTINUED | OUTPATIENT
Start: 2018-09-28 | End: 2018-09-30 | Stop reason: HOSPADM

## 2018-09-28 RX ORDER — ONDANSETRON 4 MG/1
4 TABLET, FILM COATED ORAL EVERY 6 HOURS PRN
Status: DISCONTINUED | OUTPATIENT
Start: 2018-09-28 | End: 2018-09-30 | Stop reason: HOSPADM

## 2018-09-28 RX ORDER — ONDANSETRON 2 MG/ML
4 INJECTION INTRAMUSCULAR; INTRAVENOUS EVERY 6 HOURS PRN
Status: DISCONTINUED | OUTPATIENT
Start: 2018-09-28 | End: 2018-09-30 | Stop reason: HOSPADM

## 2018-09-28 RX ORDER — LOSARTAN POTASSIUM 50 MG/1
100 TABLET ORAL EVERY EVENING
Status: DISCONTINUED | OUTPATIENT
Start: 2018-09-28 | End: 2018-09-30 | Stop reason: HOSPADM

## 2018-09-28 RX ORDER — MEPERIDINE HYDROCHLORIDE 25 MG/ML
12.5 INJECTION INTRAMUSCULAR; INTRAVENOUS; SUBCUTANEOUS
Status: DISCONTINUED | OUTPATIENT
Start: 2018-09-28 | End: 2018-09-28 | Stop reason: HOSPADM

## 2018-09-28 RX ORDER — ACETAMINOPHEN 325 MG/1
650 TABLET ORAL EVERY 4 HOURS PRN
Status: DISCONTINUED | OUTPATIENT
Start: 2018-09-28 | End: 2018-09-30 | Stop reason: HOSPADM

## 2018-09-28 RX ORDER — PROPOFOL 10 MG/ML
VIAL (ML) INTRAVENOUS AS NEEDED
Status: DISCONTINUED | OUTPATIENT
Start: 2018-09-28 | End: 2018-09-28 | Stop reason: SURG

## 2018-09-28 RX ORDER — LABETALOL HYDROCHLORIDE 5 MG/ML
5 INJECTION, SOLUTION INTRAVENOUS
Status: DISCONTINUED | OUTPATIENT
Start: 2018-09-28 | End: 2018-09-28 | Stop reason: HOSPADM

## 2018-09-28 RX ORDER — SODIUM CHLORIDE 0.9 % (FLUSH) 0.9 %
1-10 SYRINGE (ML) INJECTION AS NEEDED
Status: DISCONTINUED | OUTPATIENT
Start: 2018-09-28 | End: 2018-09-30 | Stop reason: HOSPADM

## 2018-09-28 RX ORDER — LIDOCAINE HYDROCHLORIDE 40 MG/ML
4 INJECTION, SOLUTION RETROBULBAR; TOPICAL ONCE
Status: COMPLETED | OUTPATIENT
Start: 2018-09-28 | End: 2018-09-28

## 2018-09-28 RX ORDER — SODIUM CHLORIDE, SODIUM LACTATE, POTASSIUM CHLORIDE, CALCIUM CHLORIDE 600; 310; 30; 20 MG/100ML; MG/100ML; MG/100ML; MG/100ML
100 INJECTION, SOLUTION INTRAVENOUS CONTINUOUS
Status: DISCONTINUED | OUTPATIENT
Start: 2018-09-28 | End: 2018-09-29

## 2018-09-28 RX ORDER — HYDRALAZINE HYDROCHLORIDE 20 MG/ML
5 INJECTION INTRAMUSCULAR; INTRAVENOUS EVERY 6 HOURS PRN
Status: DISCONTINUED | OUTPATIENT
Start: 2018-09-28 | End: 2018-09-28 | Stop reason: HOSPADM

## 2018-09-28 RX ORDER — ONDANSETRON 2 MG/ML
4 INJECTION INTRAMUSCULAR; INTRAVENOUS ONCE AS NEEDED
Status: DISCONTINUED | OUTPATIENT
Start: 2018-09-28 | End: 2018-09-28 | Stop reason: HOSPADM

## 2018-09-28 RX ORDER — GABAPENTIN 100 MG/1
100 CAPSULE ORAL 2 TIMES DAILY
Status: DISCONTINUED | OUTPATIENT
Start: 2018-09-28 | End: 2018-09-30 | Stop reason: HOSPADM

## 2018-09-28 RX ORDER — SODIUM CHLORIDE, SODIUM LACTATE, POTASSIUM CHLORIDE, CALCIUM CHLORIDE 600; 310; 30; 20 MG/100ML; MG/100ML; MG/100ML; MG/100ML
20 INJECTION, SOLUTION INTRAVENOUS CONTINUOUS
Status: DISCONTINUED | OUTPATIENT
Start: 2018-09-28 | End: 2018-09-29

## 2018-09-28 RX ORDER — LEVOTHYROXINE SODIUM 0.05 MG/1
50 TABLET ORAL EVERY MORNING
Status: DISCONTINUED | OUTPATIENT
Start: 2018-09-28 | End: 2018-09-30 | Stop reason: HOSPADM

## 2018-09-28 RX ORDER — FERROUS SULFATE 325(65) MG
325 TABLET ORAL
Status: DISCONTINUED | OUTPATIENT
Start: 2018-09-28 | End: 2018-09-30 | Stop reason: HOSPADM

## 2018-09-28 RX ORDER — METOPROLOL TARTRATE 50 MG/1
50 TABLET, FILM COATED ORAL EVERY 12 HOURS SCHEDULED
Status: DISCONTINUED | OUTPATIENT
Start: 2018-09-28 | End: 2018-09-30 | Stop reason: HOSPADM

## 2018-09-28 RX ORDER — FAMOTIDINE 10 MG/ML
20 INJECTION, SOLUTION INTRAVENOUS ONCE
Status: DISCONTINUED | OUTPATIENT
Start: 2018-09-28 | End: 2018-09-28 | Stop reason: HOSPADM

## 2018-09-28 RX ORDER — EPHEDRINE SULFATE 50 MG/ML
5 INJECTION, SOLUTION INTRAVENOUS ONCE AS NEEDED
Status: DISCONTINUED | OUTPATIENT
Start: 2018-09-28 | End: 2018-09-28 | Stop reason: HOSPADM

## 2018-09-28 RX ORDER — NALOXONE HCL 0.4 MG/ML
0.4 VIAL (ML) INJECTION AS NEEDED
Status: DISCONTINUED | OUTPATIENT
Start: 2018-09-28 | End: 2018-09-28 | Stop reason: HOSPADM

## 2018-09-28 RX ADMIN — BENZONATATE 200 MG: 100 CAPSULE ORAL at 10:16

## 2018-09-28 RX ADMIN — DIATRIZOATE MEGLUMINE AND DIATRIZOATE SODIUM 75 ML: 660; 100 LIQUID ORAL; RECTAL at 11:45

## 2018-09-28 RX ADMIN — SODIUM CHLORIDE, POTASSIUM CHLORIDE, SODIUM LACTATE AND CALCIUM CHLORIDE 20 ML/HR: 600; 310; 30; 20 INJECTION, SOLUTION INTRAVENOUS at 16:54

## 2018-09-28 RX ADMIN — BENZONATATE 200 MG: 100 CAPSULE ORAL at 18:23

## 2018-09-28 RX ADMIN — GABAPENTIN 100 MG: 100 CAPSULE ORAL at 10:16

## 2018-09-28 RX ADMIN — METOPROLOL TARTRATE 50 MG: 50 TABLET ORAL at 20:38

## 2018-09-28 RX ADMIN — SODIUM CHLORIDE, POTASSIUM CHLORIDE, SODIUM LACTATE AND CALCIUM CHLORIDE 100 ML/HR: 600; 310; 30; 20 INJECTION, SOLUTION INTRAVENOUS at 18:28

## 2018-09-28 RX ADMIN — METOPROLOL TARTRATE 50 MG: 50 TABLET ORAL at 10:16

## 2018-09-28 RX ADMIN — HYDRALAZINE HYDROCHLORIDE 5 MG: 20 INJECTION INTRAMUSCULAR; INTRAVENOUS at 17:15

## 2018-09-28 RX ADMIN — LIDOCAINE HYDROCHLORIDE 3 ML: 40 INJECTION, SOLUTION RETROBULBAR; TOPICAL at 16:51

## 2018-09-28 RX ADMIN — ACETAMINOPHEN 650 MG: 325 TABLET, FILM COATED ORAL at 20:38

## 2018-09-28 RX ADMIN — AMLODIPINE BESYLATE 2.5 MG: 2.5 TABLET ORAL at 10:16

## 2018-09-28 RX ADMIN — LOSARTAN POTASSIUM 100 MG: 50 TABLET ORAL at 18:23

## 2018-09-28 RX ADMIN — PROPOFOL 50 MG: 10 INJECTION, EMULSION INTRAVENOUS at 17:04

## 2018-09-28 RX ADMIN — GABAPENTIN 100 MG: 100 CAPSULE ORAL at 20:38

## 2018-09-28 RX ADMIN — LEVOTHYROXINE SODIUM 50 MCG: 50 TABLET ORAL at 10:16

## 2018-09-28 NOTE — ANESTHESIA POSTPROCEDURE EVALUATION
Patient: Ranjana Hatfield    Procedure Summary     Date:  09/28/18 Room / Location:   [facility]    Anesthesia Start:  1701 Anesthesia Stop:      Procedure:  BRONCHOSCOPY (N/A Bronchus) Diagnosis:      Surgeon:  Salbador Dorman MD Provider:  Junior Jacob MD    Anesthesia Type:  MAC ASA Status:  3          Anesthesia Type: MAC  Last vitals  BP   170/99 (09/28/18 1530)170/93   Temp   97.8 °F (36.6 °C) (09/28/18 1530)97   Pulse   72 (09/28/18 1600)68   Resp   16 (09/28/18 1530)  18   SpO2   93 % (09/28/18 1600)90     Post Anesthesia Care and Evaluation    Patient location during evaluation: PACU  Patient participation: complete - patient participated  Level of consciousness: awake and alert  Pain score: 0  Pain management: adequate  Airway patency: patent  Anesthetic complications: No anesthetic complications  PONV Status: none  Cardiovascular status: hemodynamically stable and acceptable  Respiratory status: nonlabored ventilation, acceptable and nasal cannula  Hydration status: acceptable

## 2018-09-29 ENCOUNTER — APPOINTMENT (OUTPATIENT)
Dept: GENERAL RADIOLOGY | Facility: HOSPITAL | Age: 83
End: 2018-09-29

## 2018-09-29 PROCEDURE — 99232 SBSQ HOSP IP/OBS MODERATE 35: CPT | Performed by: INTERNAL MEDICINE

## 2018-09-29 PROCEDURE — 99024 POSTOP FOLLOW-UP VISIT: CPT | Performed by: THORACIC SURGERY (CARDIOTHORACIC VASCULAR SURGERY)

## 2018-09-29 PROCEDURE — 90732 PPSV23 VACC 2 YRS+ SUBQ/IM: CPT | Performed by: FAMILY MEDICINE

## 2018-09-29 PROCEDURE — 25010000002 INFLUENZA VAC SUBUNIT QUAD 0.5 ML SUSPENSION PREFILLED SYRINGE: Performed by: FAMILY MEDICINE

## 2018-09-29 PROCEDURE — 90661 CCIIV3 VAC ABX FR 0.5 ML IM: CPT | Performed by: FAMILY MEDICINE

## 2018-09-29 PROCEDURE — G0009 ADMIN PNEUMOCOCCAL VACCINE: HCPCS | Performed by: FAMILY MEDICINE

## 2018-09-29 PROCEDURE — 25010000002 PNEUMOCOCCAL VAC POLYVALENT PER 0.5 ML: Performed by: FAMILY MEDICINE

## 2018-09-29 PROCEDURE — 71045 X-RAY EXAM CHEST 1 VIEW: CPT

## 2018-09-29 PROCEDURE — G0008 ADMIN INFLUENZA VIRUS VAC: HCPCS | Performed by: FAMILY MEDICINE

## 2018-09-29 RX ADMIN — APIXABAN 5 MG: 5 TABLET, FILM COATED ORAL at 14:33

## 2018-09-29 RX ADMIN — APIXABAN 5 MG: 5 TABLET, FILM COATED ORAL at 20:32

## 2018-09-29 RX ADMIN — INFLUENZA A VIRUS A/SINGAPORE/GP1908/2015 IVR-180 (H1N1) ANTIGEN (MDCK CELL DERIVED, PROPIOLACTONE INACTIVATED), INFLUENZA A VIRUS A/NORTH CAROLINA/04/2016 (H3N2) HEMAGGLUTININ ANTIGEN (MDCK CELL DERIVED, PROPIOLACTONE INACTIVATED), INFLUENZA B VIRUS B/IOWA/06/2017 HEMAGGLUTININ ANTIGEN (MDCK CELL DERIVED, PROPIOLACTONE INACTIVATED), INFLUENZA B VIRUS B/SINGAPORE/INFTT-16-0610/2016 HEMAGGLUTININ ANTIGEN (MDCK CELL DERIVED, PROPIOLACTONE INACTIVATED) 0.5 ML: 15; 15; 15; 15 INJECTION, SUSPENSION INTRAMUSCULAR at 14:36

## 2018-09-29 RX ADMIN — GABAPENTIN 100 MG: 100 CAPSULE ORAL at 10:38

## 2018-09-29 RX ADMIN — LEVOTHYROXINE SODIUM 50 MCG: 50 TABLET ORAL at 06:01

## 2018-09-29 RX ADMIN — FERROUS SULFATE TAB 325 MG (65 MG ELEMENTAL FE) 325 MG: 325 (65 FE) TAB at 10:38

## 2018-09-29 RX ADMIN — PNEUMOCOCCAL VACCINE POLYVALENT 0.5 ML
25; 25; 25; 25; 25; 25; 25; 25; 25; 25; 25; 25; 25; 25; 25; 25; 25; 25; 25; 25; 25; 25; 25 INJECTION, SOLUTION INTRAMUSCULAR; SUBCUTANEOUS at 14:33

## 2018-09-29 RX ADMIN — GABAPENTIN 100 MG: 100 CAPSULE ORAL at 20:32

## 2018-09-29 RX ADMIN — SODIUM CHLORIDE, POTASSIUM CHLORIDE, SODIUM LACTATE AND CALCIUM CHLORIDE 100 ML/HR: 600; 310; 30; 20 INJECTION, SOLUTION INTRAVENOUS at 05:59

## 2018-09-29 RX ADMIN — AMLODIPINE BESYLATE 2.5 MG: 2.5 TABLET ORAL at 06:01

## 2018-09-29 RX ADMIN — LOSARTAN POTASSIUM 100 MG: 50 TABLET ORAL at 17:20

## 2018-09-30 VITALS
DIASTOLIC BLOOD PRESSURE: 97 MMHG | WEIGHT: 172.5 LBS | OXYGEN SATURATION: 98 % | SYSTOLIC BLOOD PRESSURE: 141 MMHG | HEART RATE: 53 BPM | TEMPERATURE: 97.6 F | HEIGHT: 67 IN | BODY MASS INDEX: 27.07 KG/M2 | RESPIRATION RATE: 17 BRPM

## 2018-09-30 PROCEDURE — 99024 POSTOP FOLLOW-UP VISIT: CPT | Performed by: THORACIC SURGERY (CARDIOTHORACIC VASCULAR SURGERY)

## 2018-09-30 PROCEDURE — 99239 HOSP IP/OBS DSCHRG MGMT >30: CPT | Performed by: INTERNAL MEDICINE

## 2018-09-30 RX ADMIN — LEVOTHYROXINE SODIUM 50 MCG: 50 TABLET ORAL at 06:31

## 2018-09-30 RX ADMIN — APIXABAN 5 MG: 5 TABLET, FILM COATED ORAL at 08:40

## 2018-09-30 RX ADMIN — AMLODIPINE BESYLATE 2.5 MG: 2.5 TABLET ORAL at 06:31

## 2018-09-30 RX ADMIN — GABAPENTIN 100 MG: 100 CAPSULE ORAL at 08:41

## 2018-09-30 RX ADMIN — FERROUS SULFATE TAB 325 MG (65 MG ELEMENTAL FE) 325 MG: 325 (65 FE) TAB at 08:41

## 2018-09-30 RX ADMIN — METOPROLOL TARTRATE 50 MG: 50 TABLET ORAL at 08:41

## 2018-10-01 ENCOUNTER — READMISSION MANAGEMENT (OUTPATIENT)
Dept: CALL CENTER | Facility: HOSPITAL | Age: 83
End: 2018-10-01

## 2018-10-01 NOTE — OUTREACH NOTE
Prep Survey      Responses   Facility patient discharged from?  [place]   Is patient eligible?  Yes   Discharge diagnosis  pneumomediastinum from recent bronchoscopy (crepitus to left neck)   Does the patient have one of the following disease processes/diagnoses(primary or secondary)?  Other   Does the patient have Home health ordered?  No   Is there a DME ordered?  No   Comments regarding appointments  starting radiation for breast CA - SW saw pt to help with transportation to [facility].    General alerts for this patient  new dx Breast CA , starting radiation   Prep survey completed?  Yes          Hemalatha Arana RN

## 2018-10-03 ENCOUNTER — READMISSION MANAGEMENT (OUTPATIENT)
Dept: CALL CENTER | Facility: HOSPITAL | Age: 83
End: 2018-10-03

## 2018-10-03 NOTE — OUTREACH NOTE
Medical Week 1 Survey      Responses   Facility patient discharged from?  [place]   Does the patient have one of the following disease processes/diagnoses(primary or secondary)?  Other   Is there a successful TCM telephone encounter documented?  No   Week 1 attempt successful?  No   Unsuccessful attempts  Attempt 1          Madisyn Puente RN

## 2018-10-05 ENCOUNTER — READMISSION MANAGEMENT (OUTPATIENT)
Dept: CALL CENTER | Facility: HOSPITAL | Age: 83
End: 2018-10-05

## 2018-10-05 NOTE — OUTREACH NOTE
Medical Week 1 Survey      Responses   Facility patient discharged from?  [place]   Does the patient have one of the following disease processes/diagnoses(primary or secondary)?  Other   Is there a successful TCM telephone encounter documented?  No   Week 1 attempt successful?  No   Unsuccessful attempts  Attempt 2          Madisyn Puente RN

## 2018-10-15 ENCOUNTER — READMISSION MANAGEMENT (OUTPATIENT)
Dept: CALL CENTER | Facility: HOSPITAL | Age: 83
End: 2018-10-15

## 2018-10-15 NOTE — OUTREACH NOTE
Medical Week 2 Survey      Responses   Facility patient discharged from?  [place]   Does the patient have one of the following disease processes/diagnoses(primary or secondary)?  Other   Week 2 attempt successful?  Yes   Call start time  1119   Call end time  1133   Meds reviewed with patient/caregiver?  Yes   Is the patient having any side effects they believe may be caused by any medication additions or changes?  No   Does the patient have all medications ordered at discharge?  Yes   Is the patient taking all medications as directed (includes completed medication regime)?  Yes   Does the patient have a primary care provider?   Yes   Does the patient have an appointment with their PCP within 7 days of discharge?  No   Comments regarding PCP  Dr. Alpesh Perez   Nursing Interventions  Verified appointment date/time/provider   Has the patient kept scheduled appointments due by today?  N/A   Comments  Has appt with Dr. Kaur 10/22/18 - radiation oncology   Psychosocial issues?  No   Did the patient receive a copy of their discharge instructions?  Yes   Nursing interventions  Reviewed instructions with patient   What is the patient's perception of their health status since discharge?  Same   Is the patient/caregiver able to teach back signs and symptoms related to disease process for when to call PCP?  Yes   Is the patient/caregiver able to teach back signs and symptoms related to disease process for when to call 911?  Yes   Is the patient/caregiver able to teach back the hierarchy of who to call/visit for symptoms/problems? PCP, Specialist, Home health nurse, Urgent Care, ED, 911  Yes   Week 2 Call Completed?  Yes          Claire Mckeon RN

## 2018-10-22 ENCOUNTER — HOSPITAL ENCOUNTER (OUTPATIENT)
Dept: RADIATION ONCOLOGY | Facility: HOSPITAL | Age: 83
Discharge: HOME | End: 2018-10-22

## 2018-10-22 ENCOUNTER — OFFICE VISIT (OUTPATIENT)
Dept: RADIATION ONCOLOGY | Facility: HOSPITAL | Age: 83
End: 2018-10-22

## 2018-10-22 ENCOUNTER — HOSPITAL ENCOUNTER (OUTPATIENT)
Dept: RADIATION ONCOLOGY | Facility: HOSPITAL | Age: 83
Setting detail: RADIATION/ONCOLOGY SERIES
Discharge: HOME | End: 2018-10-22

## 2018-10-22 VITALS
BODY MASS INDEX: 28.94 KG/M2 | RESPIRATION RATE: 20 BRPM | DIASTOLIC BLOOD PRESSURE: 89 MMHG | SYSTOLIC BLOOD PRESSURE: 191 MMHG | HEART RATE: 56 BPM | WEIGHT: 184.4 LBS | TEMPERATURE: 96.5 F | OXYGEN SATURATION: 97 %

## 2018-10-22 DIAGNOSIS — C50.111 MALIGNANT NEOPLASM OF CENTRAL PORTION OF RIGHT BREAST IN FEMALE, ESTROGEN RECEPTOR POSITIVE (HCC): Primary | ICD-10-CM

## 2018-10-22 DIAGNOSIS — Z17.0 MALIGNANT NEOPLASM OF CENTRAL PORTION OF RIGHT BREAST IN FEMALE, ESTROGEN RECEPTOR POSITIVE (HCC): Primary | ICD-10-CM

## 2018-10-22 PROCEDURE — 77290 THER RAD SIMULAJ FIELD CPLX: CPT | Performed by: RADIOLOGY

## 2018-10-22 PROCEDURE — 77332 RADIATION TREATMENT AID(S): CPT | Performed by: RADIOLOGY

## 2018-10-22 PROCEDURE — G0463 HOSPITAL OUTPT CLINIC VISIT: HCPCS

## 2018-10-22 RX ORDER — ANASTROZOLE 1 MG/1
TABLET ORAL
COMMUNITY
Start: 2018-10-20

## 2018-10-22 NOTE — PROGRESS NOTES
CONSULTATION NOTE      :                                                          1933  DATE OF CONSULTATION:                       10/22/2018   REQUESTING PHYSICIAN:                   Caroline Chung*  REASON FOR CONSULTATION:           Breast cancer (CMS/[facility])  Staging form: Breast, AJCC 8th Edition  - Clinical stage from 2018: Stage IIA (cT2, cN0, cM0, G3, ER: Positive, CT: Positive, HER2: Negative) - Signed by Jac Kaur MD on 10/22/2018  - Pathologic: Stage IA (pT2, pN1a, cM0, G1, ER: Positive, CT: Positive, HER2: Negative) - Signed by Jac Kaur MD on 10/22/2018       BRIEF HISTORY:  The patient is a very pleasant 85 y.o. female  with breast cancer.  She presented with a palpable mass in the central breast retroareolar location at 12:00.  Mammography and ultrasound 2018 showed a 2.4 cm suspicious tumor.  Biopsy performed 2018 showed breast cancer with cribriform and comedo features, intermediate to higher grade.  Estrogen and progesterone receptor positive.  HER-2/cecy was negative.  Partial mastectomy with resection of central breast complex was performed 2018.    Final tumor dimension was 3.7 cm.    Final tumor histologic type was invasive ductal carcinoma with mucinous features, Homa histologic score I/III.   Margins were negative for tumor by at least 1.2 cm.  Closest margin was deep.  Tumor did not involve the nipple.  Lymphovascular invasion was present.  Perineural invasion was absent.  One out of 3 sentinel lymph nodes contained a 5 mm metastatic deposit without evidence of extra lymphatic spread.    Staging PET/CT study showed hypermetabolic mediastinal lymphadenopathy.  [place] lymph node biopsy was negative for metastatic tumor, however, she developed a complication of pneumomediastinum.  She has recovered from that.    She has initiated hormonal therapy with Arimidex and is tolerating that well.    No Known Allergies    Social History     Social  History   • Marital status:    • Number of children: 5     Occupational History   • disabled      Social History Main Topics   • Smoking status: Never Smoker   • Smokeless tobacco: Never Used   • Alcohol use No   • Drug use: No   • Sexual activity: Defer     Other Topics Concern   • Not on file     Social History Narrative    Lives with son in [place], [place]        Past Medical History:   Diagnosis Date   • Anemia    • Arrhythmia    • Arthritis    • Atrial fibrillation (CMS/HCC)     sees cardiologist regularly    • Breast cancer (CMS/HCC)    • Cancer (CMS/HCC) 09/2018    right breast lumpectomy    • Chronic kidney disease    • Disease of thyroid gland     po meds    • Elevated cholesterol    • GI bleed 2011   • History of blood transfusion    • Hyperlipidemia    • Hypertension    • Internal hemorrhoids    • Pulmonary emboli (CMS/HCC) 2010    Warfarin therapy.IVC filter, data deficit. Warfarin discontinued.   • Use of cane as ambulatory aid    • UTI (urinary tract infection)        family history includes Emphysema in her father; Stroke in her mother.     Past Surgical History:   Procedure Laterality Date   • BACK SURGERY  2010    lower back  data-deficit   • BREAST SURGERY Right setp 2018    right breast lumpectomy    • BRONCHOSCOPY N/A 9/26/2018    Procedure: BRONCHOSCOPY WITH ENDOBRONCHIAL ULTRASOUND;  Surgeon: Salbador Dorman MD;  Location:  [facility] ENDOS[facility];  Service: Cardiothoracic   • BRONCHOSCOPY N/A 9/28/2018    Procedure: BRONCHOSCOPY;  Surgeon: Salbador Dorman MD;  Location:  [facility] END[facility];  Service: Cardiothoracic   • CATARACT EXTRACTION W/ INTRAOCULAR LENS IMPLANT  12/2015    RIGHT SIDE 12/2015 - LEFT SIDE 6/2018   • COLONOSCOPY  2011   • TONSILLECTOMY      AGE 4-5   • VARICOSE VEIN SURGERY  2008        Review of Systems   Constitutional: Negative.    HENT:  Negative.    Eyes: Negative.    Respiratory: Negative.    Cardiovascular: Negative.    Gastrointestinal: Negative.    Endocrine:  Negative.    Genitourinary: Negative.     Musculoskeletal: Positive for arthralgias (knee pain).   Skin: Negative.    Neurological: Negative.    Hematological: Bruises/bleeds easily.   Psychiatric/Behavioral: Negative.            Objective   VITAL SIGNS:   Vitals:    10/22/18 0944   BP: (!) 191/89   Pulse: 56   Resp: 20   Temp: 96.5 °F (35.8 °C)   TempSrc: Temporal Artery    SpO2: 97%   Weight: 83.6 kg (184 lb 6.4 oz)   PainSc: 0-No pain        Karnofsky score: 80      Physical Exam   Constitutional: She is oriented to person, place, and time. She appears well-developed and well-nourished.   HENT:   Head: Normocephalic and atraumatic.   Neck: Normal range of motion. Neck supple.   Cardiovascular: Normal rate, regular rhythm and normal heart sounds.    No murmur heard.  Pulmonary/Chest: Effort normal and breath sounds normal. She has no wheezes. She has no rales. Right breast exhibits skin change (Well healing transverse incision with central volume loss and absence of nipple areolar complex.  Well-healing right axillary incision.). Right breast exhibits no mass and no tenderness. Left breast exhibits no mass. Breasts are asymmetrical.   Abdominal: Soft. Bowel sounds are normal. She exhibits no distension. There is no hepatosplenomegaly. There is no tenderness.   Musculoskeletal: Normal range of motion. She exhibits no edema or tenderness.   Lymphadenopathy:     She has no cervical adenopathy.     She has no axillary adenopathy.        Right: No supraclavicular adenopathy present.        Left: No supraclavicular adenopathy present.   Neurological: She is alert and oriented to person, place, and time. She has normal strength. No sensory deficit.   Skin: Skin is warm and dry.   Psychiatric: She has a normal mood and affect. Her behavior is normal. Judgment and thought content normal.   Nursing note and vitals reviewed.           The following portions of the patient's history were reviewed and updated as appropriate:  allergies, current medications, past family history, past medical history, past social history, past surgical history and problem list.    Assessment      Right central breast cancer, status post conservation surgery with partial mastectomy and sentinel lymph node biopsy.  She has a single axillary lymph node metastasis.  PET/CT showed suspicious mediastinal lymphadenopathy; however, biopsy was negative.  She has recovered from complication of pneumomediastinum.  She has hormone receptor positive breast cancer and has already initiated hormonal therapy.  Adjuvant radiotherapy is indicated for local/regional control in the right breast and lymphatics.  She has healed sufficiently after surgery to begin treatment planning for radiotherapy.    RECOMMENDATIONS:  CT simulation today.  The right breast and regional lymphatics will receive a dose of 40.05 Gray in 15 fractions over 3 weeks.    Return in about 1 week (around 10/29/2018) for [place] films, Radiotherapy treatment.  Ranjana was seen today for breast cancer.    Diagnoses and all orders for this visit:    Malignant neoplasm of central portion of right breast in female, estrogen receptor positive (CMS/HCC)         Jac Kaur MD

## 2018-10-23 ENCOUNTER — READMISSION MANAGEMENT (OUTPATIENT)
Dept: CALL CENTER | Facility: HOSPITAL | Age: 83
End: 2018-10-23

## 2018-10-23 ENCOUNTER — TELEPHONE (OUTPATIENT)
Dept: SOCIAL WORK | Facility: HOSPITAL | Age: 83
End: 2018-10-23

## 2018-10-23 NOTE — OUTREACH NOTE
Medical Week 3 Survey      Responses   Facility patient discharged from?  [place]   Does the patient have one of the following disease processes/diagnoses(primary or secondary)?  Other   Week 3 attempt successful?  No   Unsuccessful attempts  Attempt 1          Claire Mckeon RN

## 2018-10-23 NOTE — TELEPHONE ENCOUNTER
SW received a phone call from pt daughter, Teresa Rivers, to assist with pt transportation needs.  Pt lives in [place], [place] and all of her adult children have to work, which makes bringing her here difficult.  SW informed her of the resources [facility] to Recovery program and also Lyft services through [facility].  SW completed and faxed in a Road to Recovery referral.  SW encouraged her to call with any future needs or concerns.  SW available for ongoing support and resource needs.

## 2018-10-24 ENCOUNTER — READMISSION MANAGEMENT (OUTPATIENT)
Dept: CALL CENTER | Facility: HOSPITAL | Age: 83
End: 2018-10-24

## 2018-10-24 ENCOUNTER — OFFICE VISIT (OUTPATIENT)
Dept: CARDIAC SURGERY | Facility: CLINIC | Age: 83
End: 2018-10-24

## 2018-10-24 VITALS
DIASTOLIC BLOOD PRESSURE: 80 MMHG | SYSTOLIC BLOOD PRESSURE: 170 MMHG | BODY MASS INDEX: 28.25 KG/M2 | OXYGEN SATURATION: 99 % | HEART RATE: 59 BPM | HEIGHT: 67 IN | WEIGHT: 180 LBS

## 2018-10-24 DIAGNOSIS — R59.0 MEDIASTINAL LYMPHADENOPATHY: Primary | ICD-10-CM

## 2018-10-24 PROCEDURE — 99212 OFFICE O/P EST SF 10 MIN: CPT | Performed by: THORACIC SURGERY (CARDIOTHORACIC VASCULAR SURGERY)

## 2018-10-24 NOTE — OUTREACH NOTE
Medical Week 3 Survey      Responses   Facility patient discharged from?  [place]   Does the patient have one of the following disease processes/diagnoses(primary or secondary)?  Other   Week 3 attempt successful?  Yes   Call start time  1552   Call end time  1554   General alerts for this patient  new dx Breast CA , starting radiation   Discharge diagnosis  pneumomediastinum from recent bronchoscopy (crepitus to left neck)   Meds reviewed with patient/caregiver?  Yes   Is the patient having any side effects they believe may be caused by any medication additions or changes?  No   Does the patient have all medications ordered at discharge?  Yes   Is the patient taking all medications as directed (includes completed medication regime)?  Yes   Does the patient have a primary care provider?   Yes   Does the patient have an appointment with their PCP within 7 days of discharge?  Greater than 7 days   Has the patient kept scheduled appointments due by today?  Yes   Comments  She has see RadOnc and CTSurg. PCP appt next week.   Has home health visited the patient within 72 hours of discharge?  N/A   Psychosocial issues?  No   Did the patient receive a copy of their discharge instructions?  Yes   Nursing interventions  Reviewed instructions with patient   What is the patient's perception of their health status since discharge?  Improving   Is the patient/caregiver able to teach back signs and symptoms related to disease process for when to call PCP?  Yes   Is the patient/caregiver able to teach back signs and symptoms related to disease process for when to call 911?  Yes   Is the patient/caregiver able to teach back the hierarchy of who to call/visit for symptoms/problems? PCP, Specialist, Home health nurse, Urgent Care, ED, 911  Yes   Week 3 Call Completed?  Yes          Kevon Geronimo RN

## 2018-10-24 NOTE — PROGRESS NOTES
10/24/2018  Patient Information  Ranjana Hatfield                                                                                          [address] [facility] [place] [address]   9/25/1933  'PCP/Referring Physician'  Alpesh Perez, DO  [phone]  No ref. provider found    Chief Complaint   Patient presents with   • Follow-up     FU with EBUS for Mediastinal Lynphadenopathy       History of Present Illness:   The patient returns today for follow-up of her EBUS done by Dr. Dorman.  She is feeling well with no problems at the current time.  She is set up for some radiation at [facility]      Patient Active Problem List   Diagnosis   • Chronic renal insufficiency   • Anemia of chronic disease   • Hypothyroidism   • Essential hypertension   • Persistent atrial fibrillation (CMS/HCC)   • VTE (venous thromboembolism)   • Mediastinal lymphadenopathy   • Pneumomediastinum (CMS/HCC)   • Breast cancer (CMS/HCC)   • Pneumoperitoneum     Past Medical History:   Diagnosis Date   • Anemia    • Arrhythmia    • Arthritis    • Atrial fibrillation (CMS/HCC)     sees cardiologist regularly    • Breast cancer (CMS/HCC)    • Cancer (CMS/HCC) 09/2018    right breast lumpectomy    • Chronic kidney disease    • Disease of thyroid gland     po meds    • Elevated cholesterol    • GI bleed 2011   • History of blood transfusion    • Hyperlipidemia    • Hypertension    • Internal hemorrhoids    • Mediastinal lymphadenopathy    • Pulmonary emboli (CMS/HCC) 2010    Warfarin therapy.IVC filter, data deficit. Warfarin discontinued.   • Use of cane as ambulatory aid    • UTI (urinary tract infection)      Past Surgical History:   Procedure Laterality Date   • BACK SURGERY  2010    lower back  data-deficit   • BREAST SURGERY Right setp 2018    right breast lumpectomy    • BRONCHOSCOPY N/A 9/26/2018    Procedure: BRONCHOSCOPY WITH ENDOBRONCHIAL ULTRASOUND;  Surgeon: Salbador Dorman MD;  Location: [facility] ENDOSCOPY;  Service:  Cardiothoracic   • BRONCHOSCOPY N/A 9/28/2018    Procedure: BRONCHOSCOPY;  Surgeon: Salbador Dorman MD;  Location: [facility] ENDOSCOPY;  Service: Cardiothoracic   • CATARACT EXTRACTION W/ INTRAOCULAR LENS IMPLANT  12/2015    RIGHT SIDE 12/2015 - LEFT SIDE 6/2018   • COLONOSCOPY  2011   • TONSILLECTOMY      AGE 4-5   • VARICOSE VEIN SURGERY  2008       Current Outpatient Prescriptions:   •  amLODIPine (NORVASC) 2.5 MG tablet, Take 2.5 mg by mouth Every Morning., Disp: , Rfl:   •  anastrozole (ARIMIDEX) 1 MG tablet, , Disp: , Rfl:   •  apixaban (ELIQUIS) 5 MG tablet tablet, Take 1 tablet by mouth Every 12 (Twelve) Hours. Stopped for surgery per cardiologist, Disp: 60 tablet, Rfl:   •  cholecalciferol (VITAMIN D3) 1000 UNITS tablet, Take 2,000 Units by mouth Daily., Disp: , Rfl:   •  Ferrous Sulfate (SLOW FE PO), Take 1 tablet by mouth daily., Disp: , Rfl:   •  furosemide (LASIX) 20 MG tablet, Take 20 mg by mouth Every Other Day., Disp: , Rfl:   •  gabapentin (NEURONTIN) 100 MG capsule, Take 100 mg by mouth 2 (Two) Times a Day., Disp: , Rfl:   •  levothyroxine (SYNTHROID, LEVOTHROID) 25 MCG tablet, Take 50 mcg by mouth Every Morning., Disp: , Rfl:   •  losartan (COZAAR) 100 MG tablet, Take 100 mg by mouth Every Evening., Disp: , Rfl:   •  metoprolol tartrate (LOPRESSOR) 50 MG tablet, Take 1 tablet by mouth Every 12 (Twelve) Hours., Disp: 180 tablet, Rfl: 3  No Known Allergies  Social History     Social History   • Marital status:      Spouse name: N/A   • Number of children: 5   • Years of education: N/A     Occupational History   • disabled      Social History Main Topics   • Smoking status: Never Smoker   • Smokeless tobacco: Never Used   • Alcohol use No   • Drug use: No   • Sexual activity: Defer     Other Topics Concern   • Not on file     Social History Narrative    Lives with son in [place], [place]      Family History   Problem Relation Age of Onset   • Stroke Mother    • Emphysema Father      Review of  "Systems   Constitution: Negative for chills, fever, malaise/fatigue, night sweats and weight loss.   HENT: Negative for hearing loss, odynophagia and sore throat.    Cardiovascular: Negative for chest pain, dyspnea on exertion, leg swelling, orthopnea and palpitations.   Respiratory: Negative for cough and hemoptysis.    Endocrine: Negative for cold intolerance, heat intolerance, polydipsia, polyphagia and polyuria.   Hematologic/Lymphatic: Bruises/bleeds easily.   Skin: Negative for itching and rash.   Musculoskeletal: Positive for arthritis and joint pain. Negative for joint swelling and myalgias.   Gastrointestinal: Negative for abdominal pain, constipation, diarrhea, hematemesis, hematochezia, melena, nausea and vomiting.   Genitourinary: Negative for dysuria, frequency and hematuria.   Neurological: Negative for focal weakness, headaches, numbness and seizures.   Psychiatric/Behavioral: Negative for suicidal ideas.   All other systems reviewed and are negative.    Vitals:    10/24/18 1003   BP: 170/80   BP Location: Left arm   Patient Position: Sitting   Pulse: 59   SpO2: 99%   Weight: 81.6 kg (180 lb)   Height: 170.2 cm (67\")      Physical Exam   Neck: Normal range of motion. Neck supple. No JVD present. No tracheal deviation present. No thyromegaly present.   Cardiovascular: Normal rate and regular rhythm.  Exam reveals no gallop and no friction rub.    No murmur heard.  Pulmonary/Chest: No stridor. No respiratory distress. She has no wheezes. She has no rales. She exhibits no tenderness.   Abdominal: Soft. Bowel sounds are normal. There is no tenderness.   Lymphadenopathy:     She has no cervical adenopathy.     Assessment/Plan:   The patient returns today for follow-up of her EBUS  by Dr. Dorman.  The biopsies have been negative.  I had long discussion with Dr. Hernandes about this.  The patient is set up for radiation.  I will see her back as needed.         Patient Active Problem List   Diagnosis   • Chronic " renal insufficiency   • Anemia of chronic disease   • Hypothyroidism   • Essential hypertension   • Persistent atrial fibrillation (CMS/HCC)   • VTE (venous thromboembolism)   • Mediastinal lymphadenopathy   • Pneumomediastinum (CMS/HCC)   • Breast cancer (CMS/HCC)   • Pneumoperitoneum     CC: MD Alpesh Mitchell, DO Erika Larry, , editing for Manas Casanova M.D.    I, Manas Casanova MD, have read and agree with the editing done by Erika Larry, .

## 2018-10-26 PROCEDURE — 77295 3-D RADIOTHERAPY PLAN: CPT | Performed by: RADIOLOGY

## 2018-10-26 PROCEDURE — 77300 RADIATION THERAPY DOSE PLAN: CPT | Performed by: RADIOLOGY

## 2018-10-26 PROCEDURE — 77334 RADIATION TREATMENT AID(S): CPT | Performed by: RADIOLOGY

## 2018-11-01 ENCOUNTER — READMISSION MANAGEMENT (OUTPATIENT)
Dept: CALL CENTER | Facility: HOSPITAL | Age: 83
End: 2018-11-01

## 2018-11-01 ENCOUNTER — TELEPHONE (OUTPATIENT)
Dept: SOCIAL WORK | Facility: HOSPITAL | Age: 83
End: 2018-11-01

## 2018-11-01 NOTE — OUTREACH NOTE
Medical Week 4 Survey      Responses   Facility patient discharged from?  [place]   Does the patient have one of the following disease processes/diagnoses(primary or secondary)?  Other   Week 4 attempt successful?  Yes   Call start time  1044   Call end time  1051   General alerts for this patient  new dx Breast CA , starting radiation   Discharge diagnosis  pneumomediastinum from recent bronchoscopy (crepitus to left neck)   Meds reviewed with patient/caregiver?  Yes   Is the patient having any side effects they believe may be caused by any medication additions or changes?  No   Is the patient taking all medications as directed (includes completed medication regime)?  Yes   Has the patient kept scheduled appointments due by today?  Yes   What is preventing the patient from keeping their appointments?  Transportation   Comments  Needs assistance to get to daily Rad oncology treatments.  Message sent to case management   Is the patient still receiving Home Health Services?  No   Psychosocial issues?  No   What is the patient's perception of their health status since discharge?  Improving   Is the patient/caregiver able to teach back signs and symptoms related to disease process for when to call PCP?  Yes   Is the patient/caregiver able to teach back signs and symptoms related to disease process for when to call 911?  Yes   Is the patient/caregiver able to teach back the hierarchy of who to call/visit for symptoms/problems? PCP, Specialist, Home health nurse, Urgent Care, ED, 911  Yes   Week 4 Call Completed?  Yes   Would the patient like one additional call?  No   Graduated  Yes   Did the patient feel the follow up calls were helpful during their recovery period?  Yes   Was the number of calls appropriate?  Yes          Hemalatha Arana RN

## 2018-11-01 NOTE — TELEPHONE ENCOUNTER
EVAN called pt to touch base regarding transportation needs.  EVAN faxed in a second request for [facility].  EVAN informed pt that it is very important that she answer their phone call, as they cannot proceed without actually speaking with her to confirm.  Pt verbalized understanding of this.  SW available for ongoing support and resource needs.

## 2018-11-06 ENCOUNTER — HOSPITAL ENCOUNTER (OUTPATIENT)
Dept: RADIATION ONCOLOGY | Facility: HOSPITAL | Age: 83
Setting detail: RADIATION/ONCOLOGY SERIES
Discharge: HOME | End: 2018-11-06

## 2018-11-06 ENCOUNTER — HOSPITAL ENCOUNTER (OUTPATIENT)
Dept: RADIATION ONCOLOGY | Facility: HOSPITAL | Age: 83
Discharge: HOME | End: 2018-11-06

## 2018-11-06 VITALS — BODY MASS INDEX: 28.82 KG/M2 | WEIGHT: 184 LBS

## 2018-11-06 PROCEDURE — 77280 THER RAD SIMULAJ FIELD SMPL: CPT | Performed by: RADIOLOGY

## 2018-11-07 ENCOUNTER — HOSPITAL ENCOUNTER (OUTPATIENT)
Dept: RADIATION ONCOLOGY | Facility: HOSPITAL | Age: 83
Discharge: HOME | End: 2018-11-07

## 2018-11-07 PROCEDURE — 77412 RADIATION TX DELIVERY LVL 3: CPT | Performed by: RADIOLOGY

## 2018-11-08 ENCOUNTER — HOSPITAL ENCOUNTER (OUTPATIENT)
Dept: RADIATION ONCOLOGY | Facility: HOSPITAL | Age: 83
Discharge: HOME | End: 2018-11-08

## 2018-11-08 PROCEDURE — 77412 RADIATION TX DELIVERY LVL 3: CPT | Performed by: RADIOLOGY

## 2018-11-08 PROCEDURE — 77336 RADIATION PHYSICS CONSULT: CPT | Performed by: RADIOLOGY

## 2018-11-09 ENCOUNTER — HOSPITAL ENCOUNTER (OUTPATIENT)
Dept: RADIATION ONCOLOGY | Facility: HOSPITAL | Age: 83
Discharge: HOME | End: 2018-11-09

## 2018-11-09 PROCEDURE — 77412 RADIATION TX DELIVERY LVL 3: CPT | Performed by: RADIOLOGY

## 2018-11-12 ENCOUNTER — HOSPITAL ENCOUNTER (OUTPATIENT)
Dept: RADIATION ONCOLOGY | Facility: HOSPITAL | Age: 83
Discharge: HOME | End: 2018-11-12

## 2018-11-12 PROCEDURE — 77412 RADIATION TX DELIVERY LVL 3: CPT | Performed by: RADIOLOGY

## 2018-11-13 ENCOUNTER — HOSPITAL ENCOUNTER (OUTPATIENT)
Dept: RADIATION ONCOLOGY | Facility: HOSPITAL | Age: 83
Discharge: HOME | End: 2018-11-13

## 2018-11-13 VITALS — BODY MASS INDEX: 28.77 KG/M2 | WEIGHT: 183.7 LBS

## 2018-11-13 PROCEDURE — 77417 THER RADIOLOGY PORT IMAGE(S): CPT | Performed by: RADIOLOGY

## 2018-11-13 PROCEDURE — 77412 RADIATION TX DELIVERY LVL 3: CPT | Performed by: RADIOLOGY

## 2018-11-14 ENCOUNTER — HOSPITAL ENCOUNTER (OUTPATIENT)
Dept: RADIATION ONCOLOGY | Facility: HOSPITAL | Age: 83
Discharge: HOME | End: 2018-11-14

## 2018-11-14 PROCEDURE — 77412 RADIATION TX DELIVERY LVL 3: CPT | Performed by: RADIOLOGY

## 2018-11-15 ENCOUNTER — HOSPITAL ENCOUNTER (OUTPATIENT)
Dept: RADIATION ONCOLOGY | Facility: HOSPITAL | Age: 83
Discharge: HOME | End: 2018-11-15

## 2018-11-15 PROCEDURE — 77412 RADIATION TX DELIVERY LVL 3: CPT | Performed by: RADIOLOGY

## 2018-11-16 ENCOUNTER — HOSPITAL ENCOUNTER (OUTPATIENT)
Dept: RADIATION ONCOLOGY | Facility: HOSPITAL | Age: 83
Discharge: HOME | End: 2018-11-16

## 2018-11-16 PROCEDURE — 77412 RADIATION TX DELIVERY LVL 3: CPT | Performed by: RADIOLOGY

## 2018-11-16 PROCEDURE — 77336 RADIATION PHYSICS CONSULT: CPT | Performed by: RADIOLOGY

## 2018-11-19 ENCOUNTER — HOSPITAL ENCOUNTER (OUTPATIENT)
Dept: RADIATION ONCOLOGY | Facility: HOSPITAL | Age: 83
Discharge: HOME | End: 2018-11-19

## 2018-11-19 PROCEDURE — 77412 RADIATION TX DELIVERY LVL 3: CPT | Performed by: RADIOLOGY

## 2018-11-20 ENCOUNTER — HOSPITAL ENCOUNTER (OUTPATIENT)
Dept: RADIATION ONCOLOGY | Facility: HOSPITAL | Age: 83
Discharge: HOME | End: 2018-11-20

## 2018-11-20 VITALS — WEIGHT: 183.9 LBS | BODY MASS INDEX: 28.8 KG/M2

## 2018-11-20 PROCEDURE — 77412 RADIATION TX DELIVERY LVL 3: CPT | Performed by: RADIOLOGY

## 2018-11-20 PROCEDURE — 77417 THER RADIOLOGY PORT IMAGE(S): CPT | Performed by: RADIOLOGY

## 2018-11-21 ENCOUNTER — HOSPITAL ENCOUNTER (OUTPATIENT)
Dept: RADIATION ONCOLOGY | Facility: HOSPITAL | Age: 83
Discharge: HOME | End: 2018-11-21

## 2018-11-21 PROCEDURE — 77412 RADIATION TX DELIVERY LVL 3: CPT | Performed by: RADIOLOGY

## 2018-11-23 ENCOUNTER — HOSPITAL ENCOUNTER (OUTPATIENT)
Dept: RADIATION ONCOLOGY | Facility: HOSPITAL | Age: 83
Discharge: HOME | End: 2018-11-23

## 2018-11-23 PROCEDURE — 77412 RADIATION TX DELIVERY LVL 3: CPT | Performed by: RADIOLOGY

## 2018-11-23 PROCEDURE — 77336 RADIATION PHYSICS CONSULT: CPT | Performed by: RADIOLOGY

## 2018-11-26 ENCOUNTER — HOSPITAL ENCOUNTER (OUTPATIENT)
Dept: RADIATION ONCOLOGY | Facility: HOSPITAL | Age: 83
Discharge: HOME | End: 2018-11-26

## 2018-11-26 PROCEDURE — 77412 RADIATION TX DELIVERY LVL 3: CPT | Performed by: RADIOLOGY

## 2018-11-27 ENCOUNTER — DOCUMENTATION (OUTPATIENT)
Dept: NUTRITION | Facility: HOSPITAL | Age: 83
End: 2018-11-27

## 2018-11-27 ENCOUNTER — HOSPITAL ENCOUNTER (OUTPATIENT)
Dept: RADIATION ONCOLOGY | Facility: HOSPITAL | Age: 83
Discharge: HOME | End: 2018-11-27

## 2018-11-27 VITALS — BODY MASS INDEX: 29.95 KG/M2 | WEIGHT: 191.2 LBS

## 2018-11-27 PROCEDURE — 77412 RADIATION TX DELIVERY LVL 3: CPT | Performed by: RADIOLOGY

## 2018-11-28 ENCOUNTER — HOSPITAL ENCOUNTER (OUTPATIENT)
Dept: RADIATION ONCOLOGY | Facility: HOSPITAL | Age: 83
Discharge: HOME | End: 2018-11-28

## 2018-11-28 PROCEDURE — 77412 RADIATION TX DELIVERY LVL 3: CPT | Performed by: RADIOLOGY

## 2018-11-28 NOTE — RADIATION COMPLETION NOTES
RADIATION ONCOLOGY COMPLETION NOTE    PATIENT:   Ranjana Hatfield  MEDICAL RECORD:  [id number]  :    1933  COMPLETION DATE:  18  DIAGNOSIS:   Breast Cancer  Stage IIA (cT2, cN0, cM0, G3, ER: Positive, AK: Positive, HER2: Negative)  Stage IA (pT2, pN1a, cM0, G1, ER: Positive, AK: Positive, HER2: Negative)      BRIEF HISTORY:  This 85 y.o. patient completed radiotherapy.  She has a right central breast cancer, status post conservation surgery with partial mastectomy and sentinel lymph node biopsy.  She had a single axillary lymph node metastasis.  Adjuvant radiotherapy and hormonal treatment were given.    TREATMENT COURSE:  The right breast and lymphatics received 40.05 Gy in 15 daily fractions of 2.67 Gy using forward planned IMRT technique with 6/15 MV photons.     DATES OF TREATMENT:  18 thru 18    TOLERANCE:   excellent and no unexpected difficulties     STATUS:  no evidence of disease recurrence    DISPOSITION:  Follow up in Radiation Oncology in approximately 1 month.        Jac Kaur MD

## 2019-01-07 RX ORDER — APIXABAN 5 MG/1
TABLET, FILM COATED ORAL
Qty: 60 TABLET | Refills: 3 | Status: SHIPPED | OUTPATIENT
Start: 2019-01-07

## 2019-01-15 ENCOUNTER — HOSPITAL ENCOUNTER (OUTPATIENT)
Dept: RADIATION ONCOLOGY | Facility: HOSPITAL | Age: 84
Setting detail: RADIATION/ONCOLOGY SERIES
Discharge: HOME | End: 2019-01-15

## 2019-01-15 ENCOUNTER — OFFICE VISIT (OUTPATIENT)
Dept: RADIATION ONCOLOGY | Facility: HOSPITAL | Age: 84
End: 2019-01-15

## 2019-01-15 VITALS
RESPIRATION RATE: 20 BRPM | TEMPERATURE: 97.4 F | SYSTOLIC BLOOD PRESSURE: 198 MMHG | OXYGEN SATURATION: 98 % | DIASTOLIC BLOOD PRESSURE: 92 MMHG | HEART RATE: 66 BPM

## 2019-01-15 DIAGNOSIS — Z17.0 MALIGNANT NEOPLASM OF CENTRAL PORTION OF RIGHT BREAST IN FEMALE, ESTROGEN RECEPTOR POSITIVE (HCC): Primary | ICD-10-CM

## 2019-01-15 DIAGNOSIS — C50.111 MALIGNANT NEOPLASM OF CENTRAL PORTION OF RIGHT BREAST IN FEMALE, ESTROGEN RECEPTOR POSITIVE (HCC): Primary | ICD-10-CM

## 2019-01-15 PROCEDURE — G0463 HOSPITAL OUTPT CLINIC VISIT: HCPCS

## 2019-01-15 NOTE — PROGRESS NOTES
FOLLOW UP NOTE    PATIENT:                                                      Ranjana Hatfield  MEDICAL RECORD #:                        [id number]  :                                                          1933  COMPLETION DATE:   18  DIAGNOSIS:     Breast cancer (CMS/[facility])  Staging form: Breast, AJCC 8th Edition  - Clinical stage from 2018: Stage IIA (cT2, cN0, cM0, G3, ER: Positive, IA: Positive, HER2: Negative) - Signed by Jac Kaur MD on 10/22/2018  - Pathologic: Stage IA (pT2, pN1a, cM0, G1, ER: Positive, IA: Positive, HER2: Negative) - Signed by Jac Kaur MD on 10/22/2018    BRIEF HISTORY:    Initial follow-up visit after adjuvant radiotherapy.  She has a right central breast cancer, status post conservation surgery with partial mastectomy and sentinel lymph node biopsy.  She has a single axillary lymph node metastasis.  This with hormone receptor positive.  She is tolerating Arimidex well.  She tolerated radiotherapy with no significant dermatitis.  No breast tenderness.  No lymphedema.  She has not yet had had a posttreatment mammography, or follow-up with her other physicians.    MEDICATIONS: Medication reconciliation for the patient was reviewed and confirmed in the electronic medical record.    Review of Systems   Constitutional: Negative.    HENT:  Negative.    Eyes: Negative.    Respiratory: Negative.    Cardiovascular: Negative.    Gastrointestinal: Negative.    Endocrine: Negative.    Genitourinary: Negative.     Musculoskeletal: Positive for arthralgias (knee pain).   Skin: Negative.    Neurological: Negative.    Hematological: Negative.    Psychiatric/Behavioral: Negative.        KPS 70%    Physical Exam   Constitutional: She is oriented to person, place, and time. She appears well-developed and well-nourished.   HENT:   Head: Normocephalic and atraumatic.   Neck: Normal range of motion. Neck supple.   Cardiovascular: Normal rate, regular rhythm and normal heart  sounds.   No murmur heard.  Pulmonary/Chest: Effort normal and breath sounds normal. She has no wheezes. She has no rales. Right breast exhibits no mass and no tenderness (tissue without suspicious change.). Inverted nipple:   Central volume loss with sacrifice of nipple that tissues are soft. Skin change: minimal hyperpigmentation focally above the central upper chest with tanned keratoses inferiorly. Left breast exhibits no mass, no skin change and no tenderness.   Abdominal: Soft. Bowel sounds are normal. She exhibits no distension. There is no hepatosplenomegaly. There is no tenderness.   Musculoskeletal: Normal range of motion. She exhibits no edema or tenderness.   Lymphadenopathy:     She has no cervical adenopathy.     She has no axillary adenopathy.        Right: No supraclavicular adenopathy present.        Left: No supraclavicular adenopathy present.   Neurological: She is alert and oriented to person, place, and time. She has normal strength. No sensory deficit.   Skin: Skin is warm and dry.   Psychiatric: She has a normal mood and affect. Her behavior is normal. Judgment and thought content normal.   Nursing note and vitals reviewed.      VITAL SIGNS:   Vitals:    01/15/19 1004   BP: (!) 198/92   Pulse: 66   Resp: 20   Temp: 97.4 °F (36.3 °C)   TempSrc: Temporal   SpO2: 98%   PainSc: 0-No pain       The following portions of the patient's history were reviewed and updated as appropriate: allergies, current medications, past family history, past medical history, past social history, past surgical history and problem list.         Ranjana was seen today for breast cancer.    Diagnoses and all orders for this visit:    Malignant neoplasm of central portion of right breast in female, estrogen receptor positive (CMS/HCC)         IMPRESSION:  Right central breast cancer, pathologic stage IIb (T2, N1 A, M0), hormone receptor positive, status post conservation treatment.  She tolerated adjuvant radiotherapy  very well.  She is tolerating Arimidex hormonal therapy well.  She has no evidence of disease recurrence.    RECOMMENDATIONS:  She has follow-up with her medical oncologist, Dr. Kruse, later this month.  She is not sure when she will have follow-up with her surgeon.  Posttreatment baseline mammography will likely be ordered within the next 6 months.  I would like to see her back again in 6 months for 1 additional follow-up visit, if she is able to travel.    Return in about 6 months (around 7/15/2019) for Office Visit.    Jac Kaur MD    Errors in dictation may reflect use of voice recognition software and not all errors in transcription may have been detected prior to signing.

## 2019-02-19 ENCOUNTER — APPOINTMENT (OUTPATIENT)
Dept: RADIATION ONCOLOGY | Facility: HOSPITAL | Age: 84
End: 2019-02-19

## 2022-12-01 ENCOUNTER — OFFICE VISIT (OUTPATIENT)
Dept: CARDIAC SURGERY | Facility: CLINIC | Age: 87
End: 2022-12-01

## 2022-12-01 VITALS
WEIGHT: 160 LBS | DIASTOLIC BLOOD PRESSURE: 95 MMHG | HEART RATE: 68 BPM | BODY MASS INDEX: 25.11 KG/M2 | HEIGHT: 67 IN | TEMPERATURE: 97.5 F | SYSTOLIC BLOOD PRESSURE: 183 MMHG | OXYGEN SATURATION: 97 %

## 2022-12-01 DIAGNOSIS — R22.1 NECK MASS: Primary | ICD-10-CM

## 2022-12-01 DIAGNOSIS — Z00.6 EXAMINATION FOR NORMAL COMPARISON FOR CLINICAL RESEARCH: Primary | ICD-10-CM

## 2022-12-01 DIAGNOSIS — R22.1 MASS OF LEFT SIDE OF NECK: Primary | ICD-10-CM

## 2022-12-01 PROCEDURE — 99205 OFFICE O/P NEW HI 60 MIN: CPT | Performed by: THORACIC SURGERY (CARDIOTHORACIC VASCULAR SURGERY)

## 2022-12-01 RX ORDER — APIXABAN 2.5 MG/1
TABLET, FILM COATED ORAL
COMMUNITY
Start: 2022-11-21 | End: 2022-12-07 | Stop reason: HOSPADM

## 2022-12-01 NOTE — H&P (VIEW-ONLY)
12/01/2022  Patient Information  Ranjana Hatfield                                                                                          [address] [facility] [place] [address]   9/25/1933  'PCP/Referring Physician'  Alpesh Perez,   [phone]  No ref. provider found    Chief Complaint   Patient presents with   • Consult     Referred by Dr. Hernandes for left sided neck mass. Patient has history of right breast cancer       History of Present Illness:   The patient is an 89-year-old  female who has a left neck mass. She has been referred for an excision and biopsy. She apparently had a needle biopsy, which was non-diagnostic at [facility]. 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 Hernandes feels we need more tissue for appropriate treatment and a possible excisional biopsy. The patient states that this has happened in the last few weeks or months. The patient denies any significant weight loss. She denies any pulmonary symptoms such as cough, hemoptysis.       Patient Active Problem List   Diagnosis   • Chronic renal insufficiency   • Anemia of chronic disease   • Hypothyroidism   • Essential hypertension   • Persistent atrial fibrillation (HCC)   • VTE (venous thromboembolism)   • Mediastinal lymphadenopathy   • Pneumomediastinum (HCC)   • Breast cancer (HCC)   • Pneumoperitoneum   • Neck mass     Past Medical History:   Diagnosis Date   • Anemia    • Arrhythmia    • Arthritis    • Atrial fibrillation (HCC)     sees cardiologist regularly    • Breast cancer (HCC)    • Cancer (HCC) 09/2018    right breast lumpectomy    • Chronic kidney disease    • Disease of thyroid gland     po meds    • Elevated cholesterol    • GI bleed 2011   • History of blood transfusion    • Hyperlipidemia    • Hypertension    • Internal hemorrhoids    • Mediastinal lymphadenopathy    • Pulmonary emboli (HCC) 2010    Warfarin therapy.IVC filter, data deficit. Warfarin discontinued.   • Use  of cane as ambulatory aid    • UTI (urinary tract infection)      Past Surgical History:   Procedure Laterality Date   • BACK SURGERY  2010    lower back  data-deficit   • BREAST SURGERY Right setp 2018    right breast lumpectomy    • BRONCHOSCOPY N/A 9/26/2018    Procedure: BRONCHOSCOPY WITH ENDOBRONCHIAL ULTRASOUND;  Surgeon: Salbador Dorman MD;  Location:  [facility];  Service: Cardiothoracic   • BRONCHOSCOPY N/A 9/28/2018    Procedure: BRONCHOSCOPY;  Surgeon: Salbador Dorman MD;  Location:  [facility];  Service: Cardiothoracic   • CATARACT EXTRACTION W/ INTRAOCULAR LENS IMPLANT  12/2015    RIGHT SIDE 12/2015 - LEFT SIDE 6/2018   • COLONOSCOPY  2011   • TONSILLECTOMY      AGE 4-5   • VARICOSE VEIN SURGERY  2008       Current Outpatient Medications:   •  amLODIPine (NORVASC) 2.5 MG tablet, Take 2.5 mg by mouth Every Morning., Disp: , Rfl:   •  anastrozole (ARIMIDEX) 1 MG tablet, , Disp: , Rfl:   •  cholecalciferol (VITAMIN D3) 1000 UNITS tablet, Take 2,000 Units by mouth Daily., Disp: , Rfl:   •  Eliquis 2.5 MG tablet tablet, , Disp: , Rfl:   •  ELIQUIS 5 MG tablet tablet, TAKE ONE TABLET BY MOUTH EVERY 12 HOURS, Disp: 60 tablet, Rfl: 3  •  Ferrous Sulfate (SLOW FE PO), Take 1 tablet by mouth daily., Disp: , Rfl:   •  furosemide (LASIX) 20 MG tablet, Take 20 mg by mouth Every Other Day., Disp: , Rfl:   •  gabapentin (NEURONTIN) 100 MG capsule, Take 100 mg by mouth 2 (Two) Times a Day., Disp: , Rfl:   •  levothyroxine (SYNTHROID, LEVOTHROID) 25 MCG tablet, Take 50 mcg by mouth Every Morning., Disp: , Rfl:   •  losartan (COZAAR) 100 MG tablet, Take 100 mg by mouth Every Evening., Disp: , Rfl:   •  metoprolol tartrate (LOPRESSOR) 50 MG tablet, Take 1 tablet by mouth Every 12 (Twelve) Hours., Disp: 180 tablet, Rfl: 3  No Known Allergies  Social History     Socioeconomic History   • Marital status:    • Number of children: 5   Tobacco Use   • Smoking status: Never   • Smokeless tobacco: Never   Vaping  "Use   • Vaping Use: Never used   Substance and Sexual Activity   • Alcohol use: No   • Drug use: No   • Sexual activity: Defer     Family History   Problem Relation Age of Onset   • Stroke Mother    • Emphysema Father      Review of Systems   Constitutional: Negative for chills, fever, malaise/fatigue, night sweats and weight loss.   HENT: Negative for hearing loss, odynophagia and sore throat.    Cardiovascular: Positive for leg swelling. Negative for chest pain, dyspnea on exertion, orthopnea and palpitations.   Respiratory: Negative for cough and hemoptysis.    Endocrine: Negative for cold intolerance, heat intolerance, polydipsia, polyphagia and polyuria.   Hematologic/Lymphatic: Does not bruise/bleed easily.   Skin: Negative for itching and rash.   Musculoskeletal: Negative for joint pain, joint swelling and myalgias.   Gastrointestinal: Negative for abdominal pain, constipation, diarrhea, hematemesis, hematochezia, melena, nausea and vomiting.   Genitourinary: Negative for dysuria, frequency and hematuria.   Neurological: Negative for focal weakness, headaches, numbness and seizures.   Psychiatric/Behavioral: Negative for suicidal ideas.   All other systems reviewed and are negative.    Vitals:    12/01/22 1108   BP: (!) 183/95   Pulse: 68   Temp: 97.5 °F (36.4 °C)   SpO2: 97%   Weight: 72.6 kg (160 lb)   Height: 170.2 cm (67\")      Physical Exam   CONSTITUTIONAL:  Oriented x3, well-nourished, well developed, in no acute distress.  EYES:    Eyes anicteric.  EOMI.  PERRLA.   ENT:                Good dentition.  NECK:     Large mass in the left supraclavicular area.   LUNGS:           Decreased in the bases; no wheezing, rales or rhonchi.  CARDIOVASCULAR:  Regular rate and rhythm without murmur, rub or gallop.  There are no carotid bruits.  GI:     Abdomen is soft, nontender, nondistended with normal bowel sounds.  No hepatosplenomegaly and no masses.  EXTREMITIES:   No cyanosis, clubbing or edema.  SKIN:   No " ulcerations, petechiae or bruising.  MUSCULOSKELETAL:  Full range of motion with no deformity of extremities.  NEURO:  Cranial nerves II-XII, motor and sensory exams are intact.  PSYCH:  Alert and oriented; mood and affect good.    The ROS, past medical history, surgical history, family history, social history and vitals were reviewed by myself and corrected as needed.      Labs/Imaging:  I have obtained and reviewed the medical records from Dr. Hernandes, including the PET and pathology demonstrating a left neck mass. The patient is not a smoker, therefore no smoking cessation was discussed. A discussion was held regarding an advanced directive.    Assessment/Plan:    The patient is an 89-year-old  female who presents with a large left supraclavicular mass. I have personally discussed this case with Dr. Hernandes in [facility]. He wants to have this excised and more of a biopsy so they can appropriately treat the patient. I have personally examined the films and I concur with the interpretation of the radiologist. The MRI is negative for no evidence of disease of the brain. I have had a long discussion with her about the treatment, operation, risks and alternatives. She appears to be fully informed and desires to proceed.     Patient Active Problem List   Diagnosis   • Chronic renal insufficiency   • Anemia of chronic disease   • Hypothyroidism   • Essential hypertension   • Persistent atrial fibrillation (HCC)   • VTE (venous thromboembolism)   • Mediastinal lymphadenopathy   • Pneumomediastinum (HCC)   • Breast cancer (HCC)   • Pneumoperitoneum   • Neck mass       CC: DO Caroline Maldonado MD Regina Fugate editing for Manas Casanova MD    I, Manas Casanova MD, have read and agree with the editing done by Lakeisha Meléndez, .

## 2022-12-02 ENCOUNTER — HOSPITAL ENCOUNTER (OUTPATIENT)
Dept: GENERAL RADIOLOGY | Facility: HOSPITAL | Age: 87
Discharge: HOME | End: 2022-12-02

## 2022-12-02 ENCOUNTER — APPOINTMENT (OUTPATIENT)
Dept: OTHER | Facility: HOSPITAL | Age: 87
End: 2022-12-02

## 2022-12-02 ENCOUNTER — PREP FOR SURGERY (OUTPATIENT)
Dept: OTHER | Facility: HOSPITAL | Age: 87
End: 2022-12-02

## 2022-12-02 ENCOUNTER — PRE-ADMISSION TESTING (OUTPATIENT)
Dept: PREADMISSION TESTING | Facility: HOSPITAL | Age: 87
End: 2022-12-02

## 2022-12-02 VITALS — BODY MASS INDEX: 26.02 KG/M2 | HEIGHT: 67 IN | WEIGHT: 165.79 LBS

## 2022-12-02 DIAGNOSIS — R22.1 NECK MASS: ICD-10-CM

## 2022-12-02 DIAGNOSIS — Z01.89 LABORATORY TEST: Primary | ICD-10-CM

## 2022-12-02 DIAGNOSIS — R22.1 NECK MASS: Primary | ICD-10-CM

## 2022-12-02 LAB
ANION GAP SERPL CALCULATED.3IONS-SCNC: 10 MMOL/L (ref 5–15)
APTT PPP: 35.6 SECONDS (ref 22–39)
BUN SERPL-MCNC: 21 MG/DL (ref 8–23)
BUN/CREAT SERPL: 18.3 (ref 7–25)
CALCIUM SPEC-SCNC: 9.4 MG/DL (ref 8.6–10.5)
CHLORIDE SERPL-SCNC: 103 MMOL/L (ref 98–107)
CO2 SERPL-SCNC: 30 MMOL/L (ref 22–29)
CREAT SERPL-MCNC: 1.15 MG/DL (ref 0.57–1)
DEPRECATED RDW RBC AUTO: 47.8 FL (ref 37–54)
EGFRCR SERPLBLD CKD-EPI 2021: 45.6 ML/MIN/1.73
ERYTHROCYTE [DISTWIDTH] IN BLOOD BY AUTOMATED COUNT: 14.7 % (ref 12.3–15.4)
GLUCOSE SERPL-MCNC: 102 MG/DL (ref 65–99)
HBA1C MFR BLD: 5.8 % (ref 4.8–5.6)
HCT VFR BLD AUTO: 38.8 % (ref 34–46.6)
HGB BLD-MCNC: 12.4 G/DL (ref 12–15.9)
INR PPP: 1.25 (ref 0.84–1.13)
MCH RBC QN AUTO: 28.3 PG (ref 26.6–33)
MCHC RBC AUTO-ENTMCNC: 32 G/DL (ref 31.5–35.7)
MCV RBC AUTO: 88.6 FL (ref 79–97)
PLATELET # BLD AUTO: 135 10*3/MM3 (ref 140–450)
PMV BLD AUTO: 12.3 FL (ref 6–12)
POTASSIUM SERPL-SCNC: 4.1 MMOL/L (ref 3.5–5.2)
PROTHROMBIN TIME: 15.6 SECONDS (ref 11.4–14.4)
RBC # BLD AUTO: 4.38 10*6/MM3 (ref 3.77–5.28)
SODIUM SERPL-SCNC: 143 MMOL/L (ref 136–145)
WBC NRBC COR # BLD: 5.07 10*3/MM3 (ref 3.4–10.8)

## 2022-12-02 PROCEDURE — 71046 X-RAY EXAM CHEST 2 VIEWS: CPT

## 2022-12-02 PROCEDURE — 85610 PROTHROMBIN TIME: CPT

## 2022-12-02 PROCEDURE — 83036 HEMOGLOBIN GLYCOSYLATED A1C: CPT

## 2022-12-02 PROCEDURE — 36415 COLL VENOUS BLD VENIPUNCTURE: CPT

## 2022-12-02 PROCEDURE — 85027 COMPLETE CBC AUTOMATED: CPT

## 2022-12-02 PROCEDURE — 80048 BASIC METABOLIC PNL TOTAL CA: CPT

## 2022-12-02 PROCEDURE — 85730 THROMBOPLASTIN TIME PARTIAL: CPT

## 2022-12-02 RX ORDER — CHLORHEXIDINE GLUCONATE 500 MG/1
1 CLOTH TOPICAL EVERY 12 HOURS PRN
Status: CANCELLED | OUTPATIENT
Start: 2022-12-02

## 2022-12-02 RX ORDER — CEFAZOLIN SODIUM 2 G/100ML
2 INJECTION, SOLUTION INTRAVENOUS ONCE
Status: CANCELLED | OUTPATIENT
Start: 2022-12-06 | End: 2022-12-06

## 2022-12-02 RX ORDER — POTASSIUM CHLORIDE 750 MG/1
10 TABLET, FILM COATED, EXTENDED RELEASE ORAL
COMMUNITY

## 2022-12-02 NOTE — PAT
Patient did not review general PAT education video as instructed in their preoperative information received from their surgeon.  One-on-one Pre Admission Testing general education provided during PAT visit.  Copies of PAT general education handouts (Incentive Spirometry, Meds to Beds Program, Patient Belongings, Pre-op skin preparation instructions, Blood Glucose testing, Visitor policy, Surgery FAQ, Code H) distributed to patient. Encouraged patient/family to read PAT general education handouts thoroughly and notify PAT staff with any questions or concerns. Patient instructed to bring PAT pass and completed skin prep sheet (if applicable) on the day of procedure. Patient verbalized understanding of all information and priority content.     Patient to apply Chlorhexadine wipes  to surgical area (as instructed) the night before procedure and the AM of procedure. Wipes provided.    Per Anesthesia Request, patient instructed not to take their ACE/ARB medications on the AM of surgery.    Too early to draw type and screen in PAT.  Please obtain blood bank specimen in pre-op on the day of surgery. [place] verification tube drawn in PAT.     Patient directed to Radiology Department for CXR after Pre Admission Testing Appointment.     Per Bridget in CT surgery, pt last dose of Eliquis should be 12-3-2022 morning dose.      Previous EKG from July 2022 faxed to Dr. Casanova.  Known hx of Afib.

## 2022-12-03 LAB
ABO GROUP BLD: NORMAL
RH BLD: POSITIVE

## 2022-12-03 PROCEDURE — 86901 BLOOD TYPING SEROLOGIC RH(D): CPT

## 2022-12-03 PROCEDURE — 86900 BLOOD TYPING SEROLOGIC ABO: CPT

## 2022-12-06 ENCOUNTER — ANESTHESIA (OUTPATIENT)
Dept: PERIOP | Facility: HOSPITAL | Age: 87
End: 2022-12-06

## 2022-12-06 ENCOUNTER — HOSPITAL ENCOUNTER (OUTPATIENT)
Facility: HOSPITAL | Age: 87
LOS: 1 days | Discharge: HOME | End: 2022-12-07
Attending: THORACIC SURGERY (CARDIOTHORACIC VASCULAR SURGERY) | Admitting: THORACIC SURGERY (CARDIOTHORACIC VASCULAR SURGERY)

## 2022-12-06 ENCOUNTER — ANESTHESIA EVENT (OUTPATIENT)
Dept: PERIOP | Facility: HOSPITAL | Age: 87
End: 2022-12-06

## 2022-12-06 DIAGNOSIS — R22.1 MASS OF LEFT SIDE OF NECK: ICD-10-CM

## 2022-12-06 DIAGNOSIS — R22.1 NECK MASS: ICD-10-CM

## 2022-12-06 LAB
ABO GROUP BLD: NORMAL
BLD GP AB SCN SERPL QL: NEGATIVE
RH BLD: POSITIVE
T&S EXPIRATION DATE: NORMAL

## 2022-12-06 PROCEDURE — 88341 IMHCHEM/IMCYTCHM EA ADD ANTB: CPT | Performed by: THORACIC SURGERY (CARDIOTHORACIC VASCULAR SURGERY)

## 2022-12-06 PROCEDURE — 88342 IMHCHEM/IMCYTCHM 1ST ANTB: CPT | Performed by: THORACIC SURGERY (CARDIOTHORACIC VASCULAR SURGERY)

## 2022-12-06 PROCEDURE — A9270 NON-COVERED ITEM OR SERVICE: HCPCS | Performed by: PHYSICIAN ASSISTANT

## 2022-12-06 PROCEDURE — G0378 HOSPITAL OBSERVATION PER HR: HCPCS

## 2022-12-06 PROCEDURE — A9270 NON-COVERED ITEM OR SERVICE: HCPCS | Performed by: THORACIC SURGERY (CARDIOTHORACIC VASCULAR SURGERY)

## 2022-12-06 PROCEDURE — 88332 PATH CONSLTJ SURG EA ADD BLK: CPT | Performed by: THORACIC SURGERY (CARDIOTHORACIC VASCULAR SURGERY)

## 2022-12-06 PROCEDURE — 25010000002 CEFAZOLIN IN DEXTROSE 2-4 GM/100ML-% SOLUTION: Performed by: PHYSICIAN ASSISTANT

## 2022-12-06 PROCEDURE — 88307 TISSUE EXAM BY PATHOLOGIST: CPT | Performed by: THORACIC SURGERY (CARDIOTHORACIC VASCULAR SURGERY)

## 2022-12-06 PROCEDURE — 21554 EXC NECK TUM DEEP 5 CM/>: CPT | Performed by: THORACIC SURGERY (CARDIOTHORACIC VASCULAR SURGERY)

## 2022-12-06 PROCEDURE — 63710000001 ANASTROZOLE 1 MG TABLET: Performed by: PHYSICIAN ASSISTANT

## 2022-12-06 PROCEDURE — 86850 RBC ANTIBODY SCREEN: CPT | Performed by: THORACIC SURGERY (CARDIOTHORACIC VASCULAR SURGERY)

## 2022-12-06 PROCEDURE — 63710000001 LOSARTAN 50 MG TABLET: Performed by: PHYSICIAN ASSISTANT

## 2022-12-06 PROCEDURE — 25010000002 DEXAMETHASONE PER 1 MG: Performed by: NURSE ANESTHETIST, CERTIFIED REGISTERED

## 2022-12-06 PROCEDURE — 25010000002 ONDANSETRON PER 1 MG: Performed by: NURSE ANESTHETIST, CERTIFIED REGISTERED

## 2022-12-06 PROCEDURE — 86900 BLOOD TYPING SEROLOGIC ABO: CPT | Performed by: THORACIC SURGERY (CARDIOTHORACIC VASCULAR SURGERY)

## 2022-12-06 PROCEDURE — 86901 BLOOD TYPING SEROLOGIC RH(D): CPT | Performed by: THORACIC SURGERY (CARDIOTHORACIC VASCULAR SURGERY)

## 2022-12-06 PROCEDURE — 25010000002 HYDROMORPHONE 1 MG/ML SOLUTION

## 2022-12-06 PROCEDURE — 63710000001 GABAPENTIN 100 MG CAPSULE: Performed by: THORACIC SURGERY (CARDIOTHORACIC VASCULAR SURGERY)

## 2022-12-06 PROCEDURE — 25010000002 PROPOFOL 10 MG/ML EMULSION: Performed by: NURSE ANESTHETIST, CERTIFIED REGISTERED

## 2022-12-06 PROCEDURE — C2618 PROBE/NEEDLE, CRYO: HCPCS | Performed by: THORACIC SURGERY (CARDIOTHORACIC VASCULAR SURGERY)

## 2022-12-06 PROCEDURE — 88360 TUMOR IMMUNOHISTOCHEM/MANUAL: CPT | Performed by: THORACIC SURGERY (CARDIOTHORACIC VASCULAR SURGERY)

## 2022-12-06 DEVICE — LIGACLIP MCA MULTIPLE CLIP APPLIERS, 20 MEDIUM CLIPS
Type: IMPLANTABLE DEVICE | Site: NECK | Status: FUNCTIONAL
Brand: LIGACLIP

## 2022-12-06 RX ORDER — DROPERIDOL 2.5 MG/ML
0.62 INJECTION, SOLUTION INTRAMUSCULAR; INTRAVENOUS
Status: DISCONTINUED | OUTPATIENT
Start: 2022-12-06 | End: 2022-12-06 | Stop reason: HOSPADM

## 2022-12-06 RX ORDER — MIDAZOLAM HYDROCHLORIDE 1 MG/ML
0.5 INJECTION INTRAMUSCULAR; INTRAVENOUS
Status: DISCONTINUED | OUTPATIENT
Start: 2022-12-06 | End: 2022-12-06 | Stop reason: HOSPADM

## 2022-12-06 RX ORDER — ONDANSETRON 2 MG/ML
INJECTION INTRAMUSCULAR; INTRAVENOUS AS NEEDED
Status: DISCONTINUED | OUTPATIENT
Start: 2022-12-06 | End: 2022-12-06 | Stop reason: SURG

## 2022-12-06 RX ORDER — IPRATROPIUM BROMIDE AND ALBUTEROL SULFATE 2.5; .5 MG/3ML; MG/3ML
3 SOLUTION RESPIRATORY (INHALATION) ONCE AS NEEDED
Status: DISCONTINUED | OUTPATIENT
Start: 2022-12-06 | End: 2022-12-06 | Stop reason: HOSPADM

## 2022-12-06 RX ORDER — MEMANTINE HYDROCHLORIDE 7 MG/1
7 CAPSULE, EXTENDED RELEASE ORAL DAILY
COMMUNITY

## 2022-12-06 RX ORDER — LOSARTAN POTASSIUM 50 MG/1
100 TABLET ORAL EVERY EVENING
Status: DISCONTINUED | OUTPATIENT
Start: 2022-12-06 | End: 2022-12-07 | Stop reason: HOSPADM

## 2022-12-06 RX ORDER — ACETAMINOPHEN 325 MG/1
650 TABLET ORAL EVERY 6 HOURS PRN
Status: DISCONTINUED | OUTPATIENT
Start: 2022-12-06 | End: 2022-12-07 | Stop reason: HOSPADM

## 2022-12-06 RX ORDER — BUPIVACAINE HCL/0.9 % NACL/PF 0.125 %
PLASTIC BAG, INJECTION (ML) EPIDURAL AS NEEDED
Status: DISCONTINUED | OUTPATIENT
Start: 2022-12-06 | End: 2022-12-06 | Stop reason: SURG

## 2022-12-06 RX ORDER — ONDANSETRON 2 MG/ML
4 INJECTION INTRAMUSCULAR; INTRAVENOUS EVERY 6 HOURS PRN
Status: DISCONTINUED | OUTPATIENT
Start: 2022-12-06 | End: 2022-12-07 | Stop reason: HOSPADM

## 2022-12-06 RX ORDER — LEVOTHYROXINE SODIUM 0.05 MG/1
50 TABLET ORAL EVERY MORNING
Status: DISCONTINUED | OUTPATIENT
Start: 2022-12-07 | End: 2022-12-07 | Stop reason: HOSPADM

## 2022-12-06 RX ORDER — CARVEDILOL 3.12 MG/1
3.12 TABLET ORAL DAILY
COMMUNITY

## 2022-12-06 RX ORDER — LIDOCAINE HYDROCHLORIDE 10 MG/ML
INJECTION, SOLUTION EPIDURAL; INFILTRATION; INTRACAUDAL; PERINEURAL AS NEEDED
Status: DISCONTINUED | OUTPATIENT
Start: 2022-12-06 | End: 2022-12-06 | Stop reason: SURG

## 2022-12-06 RX ORDER — SODIUM CHLORIDE 0.9 % (FLUSH) 0.9 %
3-10 SYRINGE (ML) INJECTION AS NEEDED
Status: DISCONTINUED | OUTPATIENT
Start: 2022-12-06 | End: 2022-12-06 | Stop reason: HOSPADM

## 2022-12-06 RX ORDER — PROMETHAZINE HYDROCHLORIDE 25 MG/1
25 SUPPOSITORY RECTAL ONCE AS NEEDED
Status: DISCONTINUED | OUTPATIENT
Start: 2022-12-06 | End: 2022-12-06 | Stop reason: HOSPADM

## 2022-12-06 RX ORDER — AMOXICILLIN 250 MG
2 CAPSULE ORAL 2 TIMES DAILY PRN
Status: DISCONTINUED | OUTPATIENT
Start: 2022-12-06 | End: 2022-12-07 | Stop reason: HOSPADM

## 2022-12-06 RX ORDER — HYDROCODONE BITARTRATE AND ACETAMINOPHEN 10; 325 MG/1; MG/1
1 TABLET ORAL EVERY 12 HOURS
COMMUNITY

## 2022-12-06 RX ORDER — SODIUM CHLORIDE 0.9 % (FLUSH) 0.9 %
3 SYRINGE (ML) INJECTION EVERY 12 HOURS SCHEDULED
Status: DISCONTINUED | OUTPATIENT
Start: 2022-12-06 | End: 2022-12-06 | Stop reason: HOSPADM

## 2022-12-06 RX ORDER — SODIUM CHLORIDE 9 MG/ML
9 INJECTION, SOLUTION INTRAVENOUS CONTINUOUS PRN
Status: DISCONTINUED | OUTPATIENT
Start: 2022-12-06 | End: 2022-12-07 | Stop reason: HOSPADM

## 2022-12-06 RX ORDER — SODIUM CHLORIDE 0.9 % (FLUSH) 0.9 %
10 SYRINGE (ML) INJECTION EVERY 12 HOURS SCHEDULED
Status: DISCONTINUED | OUTPATIENT
Start: 2022-12-06 | End: 2022-12-06 | Stop reason: HOSPADM

## 2022-12-06 RX ORDER — AMLODIPINE BESYLATE 2.5 MG/1
2.5 TABLET ORAL EVERY MORNING
Status: DISCONTINUED | OUTPATIENT
Start: 2022-12-07 | End: 2022-12-07 | Stop reason: HOSPADM

## 2022-12-06 RX ORDER — CEFAZOLIN SODIUM 2 G/100ML
2 INJECTION, SOLUTION INTRAVENOUS ONCE
Status: COMPLETED | OUTPATIENT
Start: 2022-12-06 | End: 2022-12-06

## 2022-12-06 RX ORDER — METOPROLOL TARTRATE 50 MG/1
50 TABLET, FILM COATED ORAL EVERY 12 HOURS SCHEDULED
Status: DISCONTINUED | OUTPATIENT
Start: 2022-12-06 | End: 2022-12-07 | Stop reason: HOSPADM

## 2022-12-06 RX ORDER — HYDRALAZINE HYDROCHLORIDE 20 MG/ML
5 INJECTION INTRAMUSCULAR; INTRAVENOUS
Status: DISCONTINUED | OUTPATIENT
Start: 2022-12-06 | End: 2022-12-06 | Stop reason: HOSPADM

## 2022-12-06 RX ORDER — HYDROMORPHONE HYDROCHLORIDE 1 MG/ML
0.5 INJECTION, SOLUTION INTRAMUSCULAR; INTRAVENOUS; SUBCUTANEOUS
Status: DISCONTINUED | OUTPATIENT
Start: 2022-12-06 | End: 2022-12-06 | Stop reason: HOSPADM

## 2022-12-06 RX ORDER — DEXAMETHASONE SODIUM PHOSPHATE 4 MG/ML
INJECTION, SOLUTION INTRA-ARTICULAR; INTRALESIONAL; INTRAMUSCULAR; INTRAVENOUS; SOFT TISSUE AS NEEDED
Status: DISCONTINUED | OUTPATIENT
Start: 2022-12-06 | End: 2022-12-06 | Stop reason: SURG

## 2022-12-06 RX ORDER — FAMOTIDINE 20 MG/1
20 TABLET, FILM COATED ORAL
Status: COMPLETED | OUTPATIENT
Start: 2022-12-06 | End: 2022-12-06

## 2022-12-06 RX ORDER — SODIUM CHLORIDE, SODIUM LACTATE, POTASSIUM CHLORIDE, CALCIUM CHLORIDE 600; 310; 30; 20 MG/100ML; MG/100ML; MG/100ML; MG/100ML
9 INJECTION, SOLUTION INTRAVENOUS CONTINUOUS
Status: DISCONTINUED | OUTPATIENT
Start: 2022-12-06 | End: 2022-12-07 | Stop reason: HOSPADM

## 2022-12-06 RX ORDER — ONDANSETRON 4 MG/1
4 TABLET, FILM COATED ORAL EVERY 6 HOURS PRN
Status: DISCONTINUED | OUTPATIENT
Start: 2022-12-06 | End: 2022-12-07 | Stop reason: HOSPADM

## 2022-12-06 RX ORDER — MAGNESIUM HYDROXIDE 1200 MG/15ML
LIQUID ORAL AS NEEDED
Status: DISCONTINUED | OUTPATIENT
Start: 2022-12-06 | End: 2022-12-06 | Stop reason: HOSPADM

## 2022-12-06 RX ORDER — CHLORHEXIDINE GLUCONATE 500 MG/1
1 CLOTH TOPICAL EVERY 12 HOURS PRN
Status: DISCONTINUED | OUTPATIENT
Start: 2022-12-06 | End: 2022-12-06 | Stop reason: HOSPADM

## 2022-12-06 RX ORDER — SODIUM CHLORIDE 9 MG/ML
40 INJECTION, SOLUTION INTRAVENOUS AS NEEDED
Status: DISCONTINUED | OUTPATIENT
Start: 2022-12-06 | End: 2022-12-06 | Stop reason: HOSPADM

## 2022-12-06 RX ORDER — FUROSEMIDE 20 MG/1
20 TABLET ORAL EVERY OTHER DAY
Status: DISCONTINUED | OUTPATIENT
Start: 2022-12-08 | End: 2022-12-07 | Stop reason: HOSPADM

## 2022-12-06 RX ORDER — CEFAZOLIN SODIUM 2 G/100ML
2 INJECTION, SOLUTION INTRAVENOUS EVERY 8 HOURS
Status: COMPLETED | OUTPATIENT
Start: 2022-12-06 | End: 2022-12-07

## 2022-12-06 RX ORDER — SODIUM CHLORIDE 450 MG/100ML
50 INJECTION, SOLUTION INTRAVENOUS CONTINUOUS
Status: DISCONTINUED | OUTPATIENT
Start: 2022-12-06 | End: 2022-12-07 | Stop reason: HOSPADM

## 2022-12-06 RX ORDER — EPHEDRINE SULFATE 50 MG/ML
INJECTION, SOLUTION INTRAVENOUS AS NEEDED
Status: DISCONTINUED | OUTPATIENT
Start: 2022-12-06 | End: 2022-12-06 | Stop reason: SURG

## 2022-12-06 RX ORDER — GABAPENTIN 100 MG/1
100 CAPSULE ORAL EVERY 12 HOURS SCHEDULED
Status: DISCONTINUED | OUTPATIENT
Start: 2022-12-06 | End: 2022-12-07 | Stop reason: HOSPADM

## 2022-12-06 RX ORDER — DROPERIDOL 2.5 MG/ML
0.62 INJECTION, SOLUTION INTRAMUSCULAR; INTRAVENOUS ONCE AS NEEDED
Status: DISCONTINUED | OUTPATIENT
Start: 2022-12-06 | End: 2022-12-06 | Stop reason: HOSPADM

## 2022-12-06 RX ORDER — ONDANSETRON 2 MG/ML
4 INJECTION INTRAMUSCULAR; INTRAVENOUS ONCE AS NEEDED
Status: DISCONTINUED | OUTPATIENT
Start: 2022-12-06 | End: 2022-12-06 | Stop reason: HOSPADM

## 2022-12-06 RX ORDER — ANASTROZOLE 1 MG/1
1 TABLET ORAL DAILY
Status: DISCONTINUED | OUTPATIENT
Start: 2022-12-06 | End: 2022-12-07 | Stop reason: HOSPADM

## 2022-12-06 RX ORDER — PROPOFOL 10 MG/ML
VIAL (ML) INTRAVENOUS AS NEEDED
Status: DISCONTINUED | OUTPATIENT
Start: 2022-12-06 | End: 2022-12-06 | Stop reason: SURG

## 2022-12-06 RX ORDER — LIDOCAINE HYDROCHLORIDE 10 MG/ML
0.5 INJECTION, SOLUTION EPIDURAL; INFILTRATION; INTRACAUDAL; PERINEURAL ONCE AS NEEDED
Status: DISCONTINUED | OUTPATIENT
Start: 2022-12-06 | End: 2022-12-06

## 2022-12-06 RX ORDER — ZOLPIDEM TARTRATE 6.25 MG/1
6.25 TABLET, FILM COATED, EXTENDED RELEASE ORAL NIGHTLY PRN
COMMUNITY

## 2022-12-06 RX ORDER — SODIUM CHLORIDE 0.9 % (FLUSH) 0.9 %
10 SYRINGE (ML) INJECTION AS NEEDED
Status: DISCONTINUED | OUTPATIENT
Start: 2022-12-06 | End: 2022-12-06 | Stop reason: HOSPADM

## 2022-12-06 RX ORDER — FENTANYL CITRATE 50 UG/ML
50 INJECTION, SOLUTION INTRAMUSCULAR; INTRAVENOUS
Status: DISCONTINUED | OUTPATIENT
Start: 2022-12-06 | End: 2022-12-06 | Stop reason: HOSPADM

## 2022-12-06 RX ORDER — ROCURONIUM BROMIDE 10 MG/ML
INJECTION, SOLUTION INTRAVENOUS AS NEEDED
Status: DISCONTINUED | OUTPATIENT
Start: 2022-12-06 | End: 2022-12-06 | Stop reason: SURG

## 2022-12-06 RX ORDER — HYDROCODONE BITARTRATE AND ACETAMINOPHEN 5; 325 MG/1; MG/1
1 TABLET ORAL ONCE AS NEEDED
Status: DISCONTINUED | OUTPATIENT
Start: 2022-12-06 | End: 2022-12-06 | Stop reason: HOSPADM

## 2022-12-06 RX ORDER — NALOXONE HCL 0.4 MG/ML
0.4 VIAL (ML) INJECTION AS NEEDED
Status: DISCONTINUED | OUTPATIENT
Start: 2022-12-06 | End: 2022-12-06 | Stop reason: HOSPADM

## 2022-12-06 RX ORDER — LIDOCAINE HYDROCHLORIDE 10 MG/ML
0.5 INJECTION, SOLUTION EPIDURAL; INFILTRATION; INTRACAUDAL; PERINEURAL ONCE AS NEEDED
Status: COMPLETED | OUTPATIENT
Start: 2022-12-06 | End: 2022-12-06

## 2022-12-06 RX ORDER — PROMETHAZINE HYDROCHLORIDE 25 MG/1
25 TABLET ORAL ONCE AS NEEDED
Status: DISCONTINUED | OUTPATIENT
Start: 2022-12-06 | End: 2022-12-06 | Stop reason: HOSPADM

## 2022-12-06 RX ORDER — LABETALOL HYDROCHLORIDE 5 MG/ML
5 INJECTION, SOLUTION INTRAVENOUS
Status: DISCONTINUED | OUTPATIENT
Start: 2022-12-06 | End: 2022-12-06 | Stop reason: HOSPADM

## 2022-12-06 RX ADMIN — LOSARTAN POTASSIUM 100 MG: 50 TABLET, FILM COATED ORAL at 21:00

## 2022-12-06 RX ADMIN — HYDROMORPHONE HYDROCHLORIDE 0.5 MG: 1 INJECTION, SOLUTION INTRAMUSCULAR; INTRAVENOUS; SUBCUTANEOUS at 11:30

## 2022-12-06 RX ADMIN — LIDOCAINE HYDROCHLORIDE 0.5 ML: 10 INJECTION, SOLUTION EPIDURAL; INFILTRATION; INTRACAUDAL; PERINEURAL at 08:14

## 2022-12-06 RX ADMIN — Medication 100 MCG: at 09:36

## 2022-12-06 RX ADMIN — SUGAMMADEX 200 MG: 100 INJECTION, SOLUTION INTRAVENOUS at 10:03

## 2022-12-06 RX ADMIN — ROCURONIUM BROMIDE 30 MG: 10 INJECTION, SOLUTION INTRAVENOUS at 09:21

## 2022-12-06 RX ADMIN — EPHEDRINE SULFATE 5 MG: 50 INJECTION INTRAVENOUS at 09:34

## 2022-12-06 RX ADMIN — FAMOTIDINE 20 MG: 20 TABLET ORAL at 08:14

## 2022-12-06 RX ADMIN — GABAPENTIN 100 MG: 100 CAPSULE ORAL at 21:00

## 2022-12-06 RX ADMIN — HYDROMORPHONE HYDROCHLORIDE 0.5 MG: 1 INJECTION, SOLUTION INTRAMUSCULAR; INTRAVENOUS; SUBCUTANEOUS at 10:50

## 2022-12-06 RX ADMIN — SODIUM CHLORIDE 9 ML/HR: 9 INJECTION, SOLUTION INTRAVENOUS at 08:14

## 2022-12-06 RX ADMIN — ONDANSETRON 4 MG: 2 INJECTION INTRAMUSCULAR; INTRAVENOUS at 09:42

## 2022-12-06 RX ADMIN — LIDOCAINE HYDROCHLORIDE 50 MG: 10 INJECTION, SOLUTION EPIDURAL; INFILTRATION; INTRACAUDAL; PERINEURAL at 09:21

## 2022-12-06 RX ADMIN — ANASTROZOLE 1 MG: 1 TABLET, COATED ORAL at 21:00

## 2022-12-06 RX ADMIN — CEFAZOLIN SODIUM 2 G: 2 INJECTION, SOLUTION INTRAVENOUS at 09:24

## 2022-12-06 RX ADMIN — CEFAZOLIN SODIUM 2 G: 2 INJECTION, SOLUTION INTRAVENOUS at 21:05

## 2022-12-06 RX ADMIN — DEXAMETHASONE SODIUM PHOSPHATE 4 MG: 4 INJECTION, SOLUTION INTRAMUSCULAR; INTRAVENOUS at 09:42

## 2022-12-06 RX ADMIN — PROPOFOL 100 MG: 10 INJECTION, EMULSION INTRAVENOUS at 09:21

## 2022-12-06 NOTE — INTERVAL H&P NOTE
[facility] Pre-op    Full history and physical note from office is attached.    VS: /62  HR 60  RR 16  T 97.5  Sat 96%RA    Immunizations:  Influenza:  2022  Pneumococcal:  UTD  Tetanus:  UTD  Covid x3: 2021      LAB Results:  Lab Results   Component Value Date    WBC 5.07 12/02/2022    HGB 12.4 12/02/2022    HCT 38.8 12/02/2022    MCV 88.6 12/02/2022     (L) 12/02/2022    NEUTROABS 7.27 09/28/2018    GLUCOSE 102 (H) 12/02/2022    BUN 21 12/02/2022    CREATININE 1.15 (H) 12/02/2022    EGFRIFNONA 58 (L) 09/28/2018     12/02/2022    K 4.1 12/02/2022     12/02/2022    CO2 30.0 (H) 12/02/2022    CALCIUM 9.4 12/02/2022    ALBUMIN 4.15 09/28/2018    AST 21 09/28/2018    ALT 11 09/28/2018    BILITOT 1.0 09/28/2018    PTT 35.6 12/02/2022    INR 1.25 (H) 12/02/2022       Cancer Staging (if applicable)  Cancer Patient: __ yes __no __unknown__N/A; If yes, clinical stage T:__ N:__M:__, stage group or __N/A      Impression: Neck mass      Plan: EXCISION MASS HEAD NECK      NAHOMI Coates   12/6/2022   08:01 EST    Message  I called patient re labs all nl psa 1 0 chol 178 ldl 121 cont to modify diet and lose wt will alfonso 1 yr      Signatures   Electronically signed by : MARY Buckner ; Apr 1 2016 11:57AM EST                       (Author)

## 2022-12-06 NOTE — ANESTHESIA PREPROCEDURE EVALUATION
Anesthesia Evaluation     Patient summary reviewed and Nursing notes reviewed   NPO Solid Status: > 8 hours  NPO Liquid Status: > 8 hours           Airway   Mallampati: I  TM distance: >3 FB  Neck ROM: full  No difficulty expected  Dental      Pulmonary     breath sounds clear to auscultation  Cardiovascular     Rhythm: irregular  Rate: normal    (+) hypertension, dysrhythmias Atrial Fib,       Neuro/Psych  GI/Hepatic/Renal/Endo    (+)  GI bleeding , renal disease, thyroid problem     Musculoskeletal     Abdominal    Substance History      OB/GYN          Other   arthritis,                      Anesthesia Plan    ASA 3     general     intravenous induction     Anesthetic plan, risks, benefits, and alternatives have been provided, discussed and informed consent has been obtained with: patient.    Plan discussed with CRNA.        CODE STATUS:

## 2022-12-06 NOTE — ANESTHESIA POSTPROCEDURE EVALUATION
Patient: Ranjana Hatfield    Procedure Summary     Date: 12/06/22 Room / Location:  [facility] OR [address] [place] [address] [facility]    Anesthesia Start: 0917 Anesthesia Stop:     Procedure: EXCISION MASS HEAD NECK (Left) Diagnosis:       Mass of left side of neck      (Mass of left side of neck [R22.1])    Surgeons: Manas Casanova MD Provider: Maxx Jones MD    Anesthesia Type: general ASA Status: 3          Anesthesia Type: general    Vitals  Vitals Value Taken Time   BP     Temp     Pulse     Resp     SpO2 92 % 12/06/22 1022   Vitals shown include unvalidated device data.        Post Anesthesia Care and Evaluation    Patient location during evaluation: PACU  Patient participation: complete - patient participated  Level of consciousness: responsive to verbal stimuli  Pain score: 0  Pain management: adequate    Airway patency: patent  Anesthetic complications: No anesthetic complications  PONV Status: none  Cardiovascular status: hemodynamically stable and acceptable  Respiratory status: nonlabored ventilation, acceptable, nasal cannula and spontaneous ventilation  Hydration status: acceptable    Comments: VSS  No anesthesia care post op

## 2022-12-06 NOTE — ANESTHESIA PROCEDURE NOTES
Airway  Urgency: elective    Date/Time: 12/6/2022 9:23 AM  Airway not difficult    General Information and Staff    Patient location during procedure: OR  CRNA/CAA: Mayito Mckeon CRNA    Indications and Patient Condition  Indications for airway management: airway protection    Preoxygenated: yes  MILS not maintained throughout  Mask difficulty assessment: 1 - vent by mask    Final Airway Details  Final airway type: endotracheal airway      Successful airway: ETT  Cuffed: yes   Successful intubation technique: direct laryngoscopy  Endotracheal tube insertion site: oral  Blade: Pernell  Blade size: 3  ETT size (mm): 7.0  Cormack-Lehane Classification: grade I - full view of glottis  Placement verified by: chest auscultation and capnometry   Measured from: lips  ETT/EBT  to lips (cm): 20  Number of attempts at approach: 1  Assessment: lips, teeth, and gum same as pre-op and atraumatic intubation    Additional Comments  Negative epigastric sounds, Breath sound equal bilaterally with symmetric chest rise and fall

## 2022-12-06 NOTE — OP NOTE
Operative Report  Ranjana Hatfield  [id number]  9/25/1933    Preop Diagnosis: Large left supraclavicular mass 4 x 6 cm, and additional 2 x 2 centimeter mass    Results of STS risk score discussed with patient and family    Postop Diagnosis same        Procedure: Excision of large left supraclavicular masses        Surgeons: Manas Casanova        Assistant: Reinaldo Dockery performed retraction, suction, irrigation, suturing Anni Bucio  Assistant: Anni Bucio PA-C was responsible for performing the following activities: Retraction, Suction, Irrigation, Suturing, Closing and Placing Dressing and their skilled assistance was necessary for the success of this case.       Operative Findings: Frozen section neuroendocrine tumor        Description: Patient was brought to the operating room and placed under general anesthesia.  The patient's left neck was prepped and draped in usual sterile fashion.  A supraclavicular transverse incision was made.  Proceeded to dissect down to the mass.  Mentions were as above outlined.  Very careful dissection was carried around the mass and it was removed and sent for frozen section.  Hemostasis was maintain with electrocoagulation and hemoclips.  There was a smaller mass which also was excised in a similar fashion.  After excision of this very careful hemostasis was carried out.  Around #15 Delfino-Navarro drain was tunneled inferiorly and sutured in place.  The wound was closed in 3 layers running 2-0 Vicryl, 3-0 Vicryl, 3-0 Monocryl subcuticular stitch.        EBL: 200 cc      Please note that portions of this note were completed with a voice recognition program. Efforts were made to edit the dictations, but words may be mistranscribed      Manas Casanova MD  12/06/22 10:26 EST

## 2022-12-07 VITALS
HEART RATE: 79 BPM | TEMPERATURE: 97.7 F | BODY MASS INDEX: 25.9 KG/M2 | DIASTOLIC BLOOD PRESSURE: 68 MMHG | SYSTOLIC BLOOD PRESSURE: 128 MMHG | HEIGHT: 67 IN | RESPIRATION RATE: 18 BRPM | OXYGEN SATURATION: 93 % | WEIGHT: 165 LBS

## 2022-12-07 LAB
ANION GAP SERPL CALCULATED.3IONS-SCNC: 11 MMOL/L (ref 5–15)
BASOPHILS # BLD AUTO: 0.01 10*3/MM3 (ref 0–0.2)
BASOPHILS NFR BLD AUTO: 0.1 % (ref 0–1.5)
BUN SERPL-MCNC: 26 MG/DL (ref 8–23)
BUN/CREAT SERPL: 24.3 (ref 7–25)
CALCIUM SPEC-SCNC: 9.1 MG/DL (ref 8.6–10.5)
CHLORIDE SERPL-SCNC: 105 MMOL/L (ref 98–107)
CO2 SERPL-SCNC: 26 MMOL/L (ref 22–29)
CREAT SERPL-MCNC: 1.07 MG/DL (ref 0.57–1)
DEPRECATED RDW RBC AUTO: 46.5 FL (ref 37–54)
EGFRCR SERPLBLD CKD-EPI 2021: 49.8 ML/MIN/1.73
EOSINOPHIL # BLD AUTO: 0 10*3/MM3 (ref 0–0.4)
EOSINOPHIL NFR BLD AUTO: 0 % (ref 0.3–6.2)
ERYTHROCYTE [DISTWIDTH] IN BLOOD BY AUTOMATED COUNT: 14.5 % (ref 12.3–15.4)
GLUCOSE SERPL-MCNC: 138 MG/DL (ref 65–99)
HCT VFR BLD AUTO: 33.8 % (ref 34–46.6)
HGB BLD-MCNC: 11 G/DL (ref 12–15.9)
IMM GRANULOCYTES # BLD AUTO: 0.04 10*3/MM3 (ref 0–0.05)
IMM GRANULOCYTES NFR BLD AUTO: 0.4 % (ref 0–0.5)
LYMPHOCYTES # BLD AUTO: 0.51 10*3/MM3 (ref 0.7–3.1)
LYMPHOCYTES NFR BLD AUTO: 5.7 % (ref 19.6–45.3)
MCH RBC QN AUTO: 28.5 PG (ref 26.6–33)
MCHC RBC AUTO-ENTMCNC: 32.5 G/DL (ref 31.5–35.7)
MCV RBC AUTO: 87.6 FL (ref 79–97)
MONOCYTES # BLD AUTO: 0.85 10*3/MM3 (ref 0.1–0.9)
MONOCYTES NFR BLD AUTO: 9.5 % (ref 5–12)
NEUTROPHILS NFR BLD AUTO: 7.57 10*3/MM3 (ref 1.7–7)
NEUTROPHILS NFR BLD AUTO: 84.3 % (ref 42.7–76)
NRBC BLD AUTO-RTO: 0 /100 WBC (ref 0–0.2)
PLATELET # BLD AUTO: 112 10*3/MM3 (ref 140–450)
PMV BLD AUTO: 12.9 FL (ref 6–12)
POTASSIUM SERPL-SCNC: 4 MMOL/L (ref 3.5–5.2)
RBC # BLD AUTO: 3.86 10*6/MM3 (ref 3.77–5.28)
SODIUM SERPL-SCNC: 142 MMOL/L (ref 136–145)
WBC NRBC COR # BLD: 8.98 10*3/MM3 (ref 3.4–10.8)

## 2022-12-07 PROCEDURE — G0378 HOSPITAL OBSERVATION PER HR: HCPCS

## 2022-12-07 PROCEDURE — A9270 NON-COVERED ITEM OR SERVICE: HCPCS | Performed by: PHYSICIAN ASSISTANT

## 2022-12-07 PROCEDURE — A9270 NON-COVERED ITEM OR SERVICE: HCPCS | Performed by: THORACIC SURGERY (CARDIOTHORACIC VASCULAR SURGERY)

## 2022-12-07 PROCEDURE — 63710000001 ACETAMINOPHEN 325 MG TABLET: Performed by: PHYSICIAN ASSISTANT

## 2022-12-07 PROCEDURE — 63710000001 GABAPENTIN 100 MG CAPSULE: Performed by: THORACIC SURGERY (CARDIOTHORACIC VASCULAR SURGERY)

## 2022-12-07 PROCEDURE — 25010000002 CEFAZOLIN IN DEXTROSE 2-4 GM/100ML-% SOLUTION: Performed by: PHYSICIAN ASSISTANT

## 2022-12-07 PROCEDURE — 80048 BASIC METABOLIC PNL TOTAL CA: CPT | Performed by: PHYSICIAN ASSISTANT

## 2022-12-07 PROCEDURE — 85025 COMPLETE CBC W/AUTO DIFF WBC: CPT | Performed by: PHYSICIAN ASSISTANT

## 2022-12-07 PROCEDURE — 63710000001 AMLODIPINE 2.5 MG TABLET: Performed by: PHYSICIAN ASSISTANT

## 2022-12-07 PROCEDURE — 63710000001 LEVOTHYROXINE 50 MCG TABLET: Performed by: PHYSICIAN ASSISTANT

## 2022-12-07 PROCEDURE — 63710000001 ANASTROZOLE 1 MG TABLET: Performed by: PHYSICIAN ASSISTANT

## 2022-12-07 RX ADMIN — SODIUM CHLORIDE 50 ML/HR: 4.5 INJECTION, SOLUTION INTRAVENOUS at 01:40

## 2022-12-07 RX ADMIN — CEFAZOLIN SODIUM 2 G: 2 INJECTION, SOLUTION INTRAVENOUS at 01:41

## 2022-12-07 RX ADMIN — ANASTROZOLE 1 MG: 1 TABLET, COATED ORAL at 09:56

## 2022-12-07 RX ADMIN — AMLODIPINE BESYLATE 2.5 MG: 2.5 TABLET ORAL at 05:53

## 2022-12-07 RX ADMIN — LEVOTHYROXINE SODIUM 50 MCG: 50 TABLET ORAL at 05:53

## 2022-12-07 RX ADMIN — ACETAMINOPHEN 650 MG: 325 TABLET, FILM COATED ORAL at 12:35

## 2022-12-07 RX ADMIN — GABAPENTIN 100 MG: 100 CAPSULE ORAL at 09:56

## 2022-12-07 NOTE — DISCHARGE SUMMARY
[facility] Discharge Summary    Name:  Ranjana Hatfield  MRN Number:  [id number]  Date of Admission: 12/6/2022  Date of Discharge:  12/7/2022    Referred By: Manas Casanova MD  PCP: Alpesh Perez DO  IP Care Team:  Patient Care Team:  Alpesh Perez DO as PCP - General (Family Medicine)  Ras Fishre IV, MD as Consulting Physician (Cardiology)  Caroline Hernandes MD as Consulting Physician (Hematology and Oncology)  Kevin Brunner MD as Consulting Physician (General Surgery)  Jac Kaur MD as Consulting Physician (Radiation Oncology)  Eugene Smith MD as Consulting Physician (Cardiology)    Discharge Diagnosis:  Past Medical History:   Diagnosis Date   • Anemia    • Arrhythmia    • Arthritis    • Atrial fibrillation (HCC)     sees cardiologist regularly    • Cancer (HCC) 09/2018    right breast lumpectomy    • Chronic kidney disease    • Disease of thyroid gland     po meds    • Elevated cholesterol    • GI bleed 2011   • History of blood transfusion     no reactions   • Hyperlipidemia    • Hypertension    • Internal hemorrhoids    • Mediastinal lymphadenopathy    • Pulmonary emboli (HCC) 2010    Warfarin therapy.IVC filter, data deficit. Warfarin discontinued.   • Use of cane as ambulatory aid    • UTI (urinary tract infection)      Active Hospital Problems    Diagnosis  POA   • **Neck mass [R22.1]  Unknown      Resolved Hospital Problems   No resolved problems to display.     Mass of left side of neck [R22.1]  Neck mass [R22.1]    Procedures Performed:  Procedure(s):  EXCISION MASS HEAD NECK       Operative Pathology:    Pending    History of Present Illness:    The patient is an 89-year-old  female who has a left neck mass. She has been referred for an excision and biopsy. She apparently had a needle biopsy, which was non-diagnostic at [facility]. 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 Hernandes feels we need more tissue for appropriate treatment and a possible excisional biopsy. The patient states that this has happened in the last few weeks or months. The patient denies any significant weight loss. She denies any pulmonary symptoms such as cough, hemoptysis.     Hospital Course:  Patient was admitted to [facility] on 12/6/2022 for scheduled excision of large left supraclavicular mass measuring 4 x 6 cm and additional 2 x 2 centimeter mass with Dr. Casanova.  Patient was extubated without complications and was sent to telemetry floor in stable condition.  POD 1: No acute events. PRISCA drain was removed. Patient was discharged home in stable condition.      Discharge Medications:     Discharge Medications      Changes to Medications      Instructions Start Date   Eliquis 5 MG tablet tablet  Generic drug: apixaban  What changed:   · how much to take  · how to take this  · when to take this  · additional instructions  · Another medication with the same name was removed. Continue taking this medication, and follow the directions you see here.   TAKE ONE TABLET BY MOUTH EVERY 12 HOURS         Continue These Medications      Instructions Start Date   amLODIPine 2.5 MG tablet  Commonly known as: NORVASC   5 mg, Oral, Every Morning      anastrozole 1 MG tablet  Commonly known as: ARIMIDEX   No dose, route, or frequency recorded.      carvedilol 3.125 MG tablet  Commonly known as: COREG   3.125 mg, Oral, Daily      cholecalciferol 25 MCG (1000 UT) tablet  Commonly known as: VITAMIN D3   2,000 Units, Oral, Daily      furosemide 20 MG tablet  Commonly known as: LASIX   20 mg, Oral, Every Other Day      gabapentin 100 MG capsule  Commonly known as: NEURONTIN   100 mg, Oral, 2 Times Daily      HYDROcodone-acetaminophen  MG per tablet  Commonly known as: NORCO   1 tablet, Oral, Every 12 Hours      levothyroxine 25 MCG tablet  Commonly known as: SYNTHROID,  LEVOTHROID   50 mcg, Oral, Every Morning      losartan 100 MG tablet  Commonly known as: COZAAR   100 mg, Oral, Every Evening      memantine 7 MG capsule sustained-release 24 hr extended release capsule  Commonly known as: NAMENDA XR   7 mg, Oral, Daily      potassium chloride 10 MEQ CR tablet   10 mEq, Oral, Take with Lasix      SLOW FE PO   1 tablet, Oral, Daily      zolpidem CR 6.25 MG CR tablet  Commonly known as: AMBIEN CR   6.25 mg, Oral, Nightly PRN         Stop These Medications    metoprolol tartrate 50 MG tablet  Commonly known as: LOPRESSOR            Allergies:  No Known Allergies    Discharge Diet:  Diet Instructions     Diet: Consistent Carbohydrate, Cardiac      Discharge Diet:  Consistent Carbohydrate  Cardiac             Activity at Discharge:  Activity Instructions     Activity as Tolerated      Driving Restrictions      Type of Restriction: Driving    Driving Restrictions: No Driving While Taking Narcotics    Lifting Restrictions      Type of Restriction: Lifting    Lifting Restrictions: Lifting Restriction (Indicate Limit)    Weight Limit (Pounds): 10    Length of Lifting Restriction: until seen at next follow-up appointment          Discharge Education:  Post-Op Education:  Patient was advised on responsible use of opioids in the post-surgical setting.  Patient was advised these medications are highly addictive.  Patient advised on proper disposal of unused opioid medication and was urged to discard any unused opioid medication.  Wound Care:  Patient educated regarding care of post-operative wounds.  Patient is to wash with plain soap and water and pat dry.  Patient is not to use salves on the incision sites.  Patient instructed regarding the signs and symptoms of infection and when to call the office with any concerns.    Follow up Appointments:   Future Appointments   Date Time Provider Department Center   12/22/2022  3:00 PM Janet Jimenez APRN [facility]     Additional Instructions for  the Follow-ups that You Need to Schedule     Call MD With Problems / Concerns   As directed      Instructions:   Please call the [facility] office with any questions or concerns you may have [phone]    Order Comments: Instructions: Please call the [facility] office with any questions or concerns you may have [phone]          Discharge Follow-up with PCP   As directed       Currently Documented PCP:    Alpesh Perez DO    PCP Phone Number:    [phone]     Follow Up Details: Follow-up with your PCP within 1-2 weeks         Discharge Follow-up with Specialty: Cardiothoracic Surgery   As directed      Specialty: Cardiothoracic Surgery    Follow Up Details: Follow-up within 2-4 weeks                 Marina Youssef, APRN  12/07/22  11:34 EST    Time: I spent 22 minutes on this discharge activity which included: face-to-face encounter with the patient, reviewing the data in the system, coordination of the care with the nursing staff as well as consultants, documentation, and entering orders.

## 2022-12-07 NOTE — PLAN OF CARE
Goal Outcome Evaluation:               Pt disoriented to place and situation @ times. Easily redirected. Afib on monitor. Room air. VSS. No complaints of pain. Up to bedside commade with x2 assist. PRISCA output: 40 ml. Dressing CDI w/ minimal dried drainage. Pt family states pt does not take metoprolol. She is on coreg. Med rec updated.

## 2022-12-07 NOTE — CASE MANAGEMENT/SOCIAL WORK
Case Management Discharge Note      Final Note: Patient has orders for discharge.  Spoke with patient and daughter at bedside regarding discharge planning.  Patient denies use of HH and reports she has a Rollator, Straight Cane, Bedside Commode, Shower Chair, Grab Bars in the  bathroom and a Blood Pressure Machine.  She report that she is able to pay for her medications but they are expensive, especially the Eliquis which has a co-pay of $60.  Discussed various ways she may find assistance with the cost of Eliquis.  She received the COVID vaccine.  No other dishcarge needs verbalized.  CM following.  Patient plan is to discharge home today via car with family to transport.         Selected Continued Care - Admitted Since 12/6/2022     Destination    No services have been selected for the patient.              Durable Medical Equipment    No services have been selected for the patient.              Dialysis/Infusion    No services have been selected for the patient.              Home Medical Care    No services have been selected for the patient.              Therapy    No services have been selected for the patient.              Community Resources    No services have been selected for the patient.              [facility]    No services have been selected for the patient.                       Final Discharge Disposition Code: 01 - home or self-care

## 2022-12-07 NOTE — PROGRESS NOTES
"Ranjana Hatfield  [id number]  9/25/1933     LOS: 1 day   Patient Care Team:  Alpesh Perez DO as PCP - General (Family Medicine)  Ras Fisher IV, MD as Consulting Physician (Cardiology)  Caroline Hernandes MD as Consulting Physician (Hematology and Oncology)  Kevin Brunner MD as Consulting Physician (General Surgery)  Jac Kaur MD as Consulting Physician (Radiation Oncology)  Eugene Smith MD as Consulting Physician (Cardiology)    Chief Complaint: Neck mass      Subjective: No complaints, wants to go home    Objective:     Vital Sign Min/Max for last 24 hours  Temp  Min: 96.6 °F (35.9 °C)  Max: 98.4 °F (36.9 °C)   BP  Min: 111/58  Max: 134/69   Pulse  Min: 55  Max: 83   Resp  Min: 14  Max: 18   SpO2  Min: 90 %  Max: 98 %   No data recorded   Weight  Min: 74.8 kg (165 lb)  Max: 74.8 kg (165 lb)     Flowsheet Rows    Flowsheet Row First Filed Value   Admission Height 170.2 cm (67\") Documented at 12/06/2022 0803   Admission Weight 74.8 kg (165 lb) Documented at 12/06/2022 0803          Physical Exam:    Wound: Satisfactory, drain had 40 cc out in the last 12 hours    Pulses:     Mediastinal and Chest Tube Drainage:       Results Review:   Results from last 7 days   Lab Units 12/02/22  1011   WBC 10*3/mm3 5.07   HEMOGLOBIN g/dL 12.4   HEMATOCRIT % 38.8   PLATELETS 10*3/mm3 135*     Results from last 7 days   Lab Units 12/02/22  1011   SODIUM mmol/L 143   POTASSIUM mmol/L 4.1   CHLORIDE mmol/L 103   CO2 mmol/L 30.0*   BUN mg/dL 21   CREATININE mg/dL 1.15*   GLUCOSE mg/dL 102*   CALCIUM mg/dL 9.4             Assessment      Neck mass      DC neck drain.  Discharge patient today.        Manas Casanova MD  12/07/22  06:34 EST      Please note that portions of this note were completed with a voice recognition program. Efforts were made to edit the dictations, but words may be mistranscribed  "

## 2022-12-07 NOTE — CASE MANAGEMENT/SOCIAL WORK
Discharge Planning Assessment  [facility]     Patient Name: Ranjana Hatfield  MRN: [id number]  Today's Date: 12/7/2022    Admit Date: 12/6/2022    Plan: Home   Discharge Needs Assessment     Row Name 12/07/22 0919       Living Environment    People in Home alone    Current Living Arrangements home    Primary Care Provided by self;child(marcos)    Provides Primary Care For no one    Family Caregiver if Needed child(marcos), adult    Family Caregiver Names Stephanie Arroyo    Quality of Family Relationships helpful;involved;supportive  dtr    Able to Return to Prior Arrangements yes       Resource/Environmental Concerns    Resource/Environmental Concerns none       Transition Planning    Patient/Family Anticipates Transition to home    Patient/Family Anticipated Services at Transition none    Transportation Anticipated family or friend will provide       Discharge Needs Assessment    Readmission Within the Last 30 Days no previous admission in last 30 days    Equipment Currently Used at Home rollator;cane, straight;commode;shower chair;grab bar;bp cuff    Concerns to be Addressed no discharge needs identified;denies needs/concerns at this time    Anticipated Changes Related to Illness none    Equipment Needed After Discharge none    Current Discharge Risk dependent with mobility/activities of daily living    Discharge Coordination/Progress Home               Discharge Plan     Row Name 12/07/22 0921       Plan    Plan Home    Patient/Family in Agreement with Plan yes    Plan Comments Patient has orders for discharge.  Spoke with patient and daughter at bedside regarding discharge planning.  Patient denies use of HH and reports she has a Rollator, Straight Cane, Bedside Commode, Shower Chair, Grab Bars in the  bathroom and a Blood Pressure Machine.  She report that she is able to pay for her medications but they are expensive, especially the Eliquis which has a co-pay of $60.  Discussed various ways she may find  assistance with the cost of Eliquis.  She received the COVID vaccine.  No other dishcarge needs verbalized.  CM following.  Patient plan is to discharge home today via car with family to transport.    Final Discharge Disposition Code 01 - home or self-care              Continued Care and Services - Admitted Since 12/6/2022    Coordination has not been started for this encounter.       Expected Discharge Date and Time     Expected Discharge Date Expected Discharge Time    Dec 7, 2022          Demographic Summary     Row Name 12/07/22 0917       General Information    Admission Type observation    Arrived From home    Referral Source admission list    Reason for Consult discharge planning    Preferred Language English    General Information Comments Alpesh Perez,        Contact Information    Permission Granted to Share Info With     Contact Information Obtained for     Contact Information Comments Stephanie Arroyo, daughter  [phone]               Functional Status     Row Name 12/07/22 0918       Functional Status    Usual Activity Tolerance good    Current Activity Tolerance good       Functional Status, IADL    Medications assistive person    Meal Preparation completely dependent    Housekeeping completely dependent    Laundry completely dependent    Shopping completely dependent       Employment/    Employment/ Comments [employer] Replacement               Psychosocial    No documentation.                Abuse/Neglect    No documentation.                Legal    No documentation.                Substance Abuse    No documentation.                Patient Forms    No documentation.                   Shantelle Marie, RN

## 2022-12-08 LAB
CYTO UR: NORMAL
LAB AP CASE REPORT: NORMAL
LAB AP CLINICAL INFORMATION: NORMAL
LAB AP DIAGNOSIS COMMENT: NORMAL
Lab: NORMAL
PATH REPORT.FINAL DX SPEC: NORMAL
PATH REPORT.GROSS SPEC: NORMAL

## 2022-12-22 ENCOUNTER — OFFICE VISIT (OUTPATIENT)
Dept: CARDIAC SURGERY | Facility: CLINIC | Age: 87
End: 2022-12-22

## 2022-12-22 VITALS
OXYGEN SATURATION: 98 % | BODY MASS INDEX: 25.9 KG/M2 | HEART RATE: 61 BPM | SYSTOLIC BLOOD PRESSURE: 135 MMHG | DIASTOLIC BLOOD PRESSURE: 70 MMHG | HEIGHT: 67 IN | WEIGHT: 165 LBS | TEMPERATURE: 97.5 F

## 2022-12-22 DIAGNOSIS — R22.1 NECK MASS: Primary | ICD-10-CM

## 2022-12-22 PROCEDURE — 99024 POSTOP FOLLOW-UP VISIT: CPT | Performed by: REGISTERED NURSE

## 2022-12-22 NOTE — PROGRESS NOTES
[facility] Cardiothoracic Surgery Office Follow Up Note    Date of Encounter: 2022     Name: Ranjana Hatfield  : 1933     Referred By: No ref. provider found  PCP: Alpesh Perez DO    Chief Complaint:    Chief Complaint   Patient presents with   • Post-op Follow-up     Hospital follow-up s/p excision of supraclavicular masses 22.        Subjective      History of Present Illness:    It was nice to see Ranjana Hatfield in follow up after surgery.  She is a pleasant 89 y.o. female with PMH significant for HTN, persistent atrial fibrillation, venous thromboembolism, hypothyroidism, chronic renal insufficiency, chronic anemia, breast cancer, and neck mass.    Patient is s/p excision of large left supraclavicular mass measuring 4 x 6 cm and additional 2 x 2 centimeter mass by Dr. Casanova on 6202.  See op report for full details.  She did well post-operatively.  On POD #1 she met required criteria and was deemed appropriate for discharge.  Without re-admission.     Ms. Hatfield presents to office today for initial post-op follow-up.  Patient denies fever, chills, fatigue, weight loss, or hemoptysis.  She is accompanied by her daughter.     Review of Systems:  Review of Systems   Constitutional: Negative for chills, decreased appetite, diaphoresis, fever, malaise/fatigue, night sweats, weight gain and weight loss.   HENT: Negative for hoarse voice.    Eyes: Negative for blurred vision, double vision and visual disturbance.   Cardiovascular: Positive for leg swelling. Negative for chest pain, claudication, dyspnea on exertion, irregular heartbeat, near-syncope, orthopnea, palpitations, paroxysmal nocturnal dyspnea and syncope.   Respiratory: Negative for cough, hemoptysis, shortness of breath, sputum production and wheezing.    Hematologic/Lymphatic: Negative for adenopathy and bleeding problem. Does not bruise/bleed easily.   Skin: Negative for color change, nail  changes, poor wound healing and rash.   Musculoskeletal: Positive for joint pain (knees). Negative for back pain, falls and muscle cramps.   Gastrointestinal: Negative for abdominal pain, dysphagia and heartburn.   Genitourinary: Negative for flank pain.   Neurological: Negative for brief paralysis, disturbances in coordination, dizziness, focal weakness, headaches, light-headedness, loss of balance, numbness, paresthesias, sensory change, vertigo and weakness.   Psychiatric/Behavioral: Negative for depression and suicidal ideas.   Allergic/Immunologic: Negative for persistent infections.       I have reviewed the following portions of the patient's history: allergies, current medications, past family history, past medical history, past social history, past surgical history and problem list and confirm it's accurate.    Allergies:  No Known Allergies    Medications:      Current Outpatient Medications:   •  amLODIPine (NORVASC) 2.5 MG tablet, Take 5 mg by mouth Every Morning., Disp: , Rfl:   •  anastrozole (ARIMIDEX) 1 MG tablet, , Disp: , Rfl:   •  carvedilol (COREG) 3.125 MG tablet, Take 3.125 mg by mouth Daily., Disp: , Rfl:   •  cholecalciferol (VITAMIN D3) 1000 UNITS tablet, Take 2,000 Units by mouth Daily., Disp: , Rfl:   •  ELIQUIS 5 MG tablet tablet, TAKE ONE TABLET BY MOUTH EVERY 12 HOURS (Patient taking differently: 5 mg Every 12 (Twelve) Hours. Last dose should be 12/3/2022 (morning dose).), Disp: 60 tablet, Rfl: 3  •  Ferrous Sulfate (SLOW FE PO), Take 1 tablet by mouth daily., Disp: , Rfl:   •  furosemide (LASIX) 20 MG tablet, Take 20 mg by mouth Every Other Day., Disp: , Rfl:   •  gabapentin (NEURONTIN) 100 MG capsule, Take 100 mg by mouth 2 (Two) Times a Day., Disp: , Rfl:   •  HYDROcodone-acetaminophen (NORCO)  MG per tablet, Take 1 tablet by mouth Every 12 (Twelve) Hours., Disp: , Rfl:   •  levothyroxine (SYNTHROID, LEVOTHROID) 25 MCG tablet, Take 50 mcg by mouth Every Morning., Disp: , Rfl:    •  losartan (COZAAR) 100 MG tablet, Take 100 mg by mouth Every Evening., Disp: , Rfl:   •  memantine (NAMENDA XR) 7 MG capsule sustained-release 24 hr extended release capsule, Take 7 mg by mouth Daily., Disp: , Rfl:   •  potassium chloride 10 MEQ CR tablet, Take 10 mEq by mouth. Take with Lasix, Disp: , Rfl:   •  zolpidem CR (AMBIEN CR) 6.25 MG CR tablet, Take 6.25 mg by mouth At Night As Needed for Sleep., Disp: , Rfl:     History:   Past Medical History:   Diagnosis Date   • Anemia    • Arrhythmia    • Arthritis    • Atrial fibrillation (HCC)     sees cardiologist regularly    • Cancer (HCC) 09/2018    right breast lumpectomy    • Chronic kidney disease    • Disease of thyroid gland     po meds    • Elevated cholesterol    • GI bleed 2011   • History of blood transfusion     no reactions   • Hyperlipidemia    • Hypertension    • Internal hemorrhoids    • Mediastinal lymphadenopathy    • Pulmonary emboli (HCC) 2010    Warfarin therapy.IVC filter, data deficit. Warfarin discontinued.   • Use of cane as ambulatory aid    • UTI (urinary tract infection)        Past Surgical History:   Procedure Laterality Date   • BACK SURGERY  2010    lower back  data-deficit   • BREAST SURGERY Right setp 2018    right breast lumpectomy    • BRONCHOSCOPY N/A 9/26/2018    Procedure: BRONCHOSCOPY WITH ENDOBRONCHIAL ULTRASOUND;  Surgeon: Salbador Dorman MD;  Location:  [facility];  Service: Cardiothoracic   • BRONCHOSCOPY N/A 9/28/2018    Procedure: BRONCHOSCOPY;  Surgeon: Salbador Dorman MD;  Location:  [facility];  Service: Cardiothoracic   • CATARACT EXTRACTION W/ INTRAOCULAR LENS IMPLANT  12/2015    RIGHT SIDE 12/2015 - LEFT SIDE 6/2018   • COLONOSCOPY  2011   • EXCISION MASS HEAD/NECK Left 12/6/2022    Procedure: EXCISION MASS HEAD NECK LEFT;  Surgeon: Manas Casanova MD;  Location:  [facility] OR;  Service: Vascular;  Laterality: Left;   • TONSILLECTOMY      AGE 4-5   • VARICOSE VEIN SURGERY  2008       Social History  "    Socioeconomic History   • Marital status:    • Number of children: 5   Tobacco Use   • Smoking status: Never   • Smokeless tobacco: Never   Vaping Use   • Vaping Use: Never used   Substance and Sexual Activity   • Alcohol use: No   • Drug use: No   • Sexual activity: Defer        Family History   Problem Relation Age of Onset   • Stroke Mother    • Emphysema Father        Objective     Physical Exam:  Vitals:    12/22/22 1525   BP: 135/70   Pulse: 61   Temp: 97.5 °F (36.4 °C)   SpO2: 98%   Weight: 74.8 kg (165 lb)   Height: 170.2 cm (67\")      Body mass index is 25.84 kg/m².    Physical Exam  Vitals reviewed.   Constitutional:       General: She is not in acute distress.     Appearance: She is not ill-appearing.   Eyes:      Pupils: Pupils are equal, round, and reactive to light.   Cardiovascular:      Rate and Rhythm: Normal rate and regular rhythm.      Heart sounds: Normal heart sounds.   Pulmonary:      Effort: Pulmonary effort is normal.      Breath sounds: Normal breath sounds.   Skin:     General: Skin is warm and dry.      Comments: Subclavian incision nicely healed without erythema, edema, drainage, or foul odor.    Neurological:      General: No focal deficit present.      Mental Status: She is alert and oriented to person, place, and time.   Psychiatric:         Mood and Affect: Mood normal.         Behavior: Behavior normal.         Thought Content: Thought content normal.         Judgment: Judgment normal.         Imaging/Labs:  Pathology Report  1.  NECK MASS, EXCISIONAL BIOPSY:  Well differentiated neuroendocrine tumor, Grade 2, see Comment  The tumor extends to the surgical margin  2.  NECK, EXCISION:  Dermal tissue and skeletal muscle, focally involved by well-differentiated neuroendocrine tumor     I personally reviewed this report.     Chest X-Ray PA & Lateral    Result Date: 12/2/2022  IMPRESSION : New parenchymal nodular opacity at the right upper lung zone. Differential includes an " inflammatory or infectious process, scarring as well as malignancy. Consider follow-up CT chest when clinically feasible  Cardiomegaly and mild pulmonary vascular congestion.[  More focal increased density overlying the left hilar structures. Findings may represent underlying vascular structures and/or mildly prominent adenopathy  This report was finalized on 12/2/2022 12:00 PM by Akash Miramontes.           Assessment / Plan      Assessment / Plan:  Diagnoses and all orders for this visit:    1. Neck mass (Primary)    · PMH significant for HTN, persistent atrial fibrillation, venous thromboembolism, hypothyroidism, chronic renal insufficiency, chronic anemia, breast cancer, and neck mass.  · Patient is s/p excision of large left supraclavicular mass measuring 4 x 6 cm and additional 2 x 2 centimeter mass by Dr. Casanova on 12/6202.   · On POD #1 deemed appropriate for discharge.  Without re-admission.   · Pathology report with well differentiated neuroendocrine tumor, Grade 2.  · Presents to office today for initial post-op follow-up.  Denies fever, chills, fatigue, weight loss, or hemoptysis.    · Patient has already followed up with Oncology.  Reports plan of hormonal injection with subsequent chemotherapy if unsuccessful.   · Subclavian incision nicely healed without erythema, edema, drainage, or foul odor.   · We will follow on an as needed basis since patient has established care with Oncology.           Patient Education:  • Continue to keep your incisions clean and dry.  Use plain antibacterial soap (i.e. dial) and water to clean and pat dry.    • Do no apply any lotions, creams, oils, powders, salves, or ointments directly to incisional sites.  • Please watch for signs and symptoms of infection which may include but are not limited to: increased pain or tenderness around your incision, warmth at the site or fever, redness or red streaking, and foul smelling or appearing drainage.  Clear drainage and bloody  drainage are not worrisome.  • If your incision opens up please contact our office.    Follow Up:   No follow-ups on file.   Or sooner for any further concerns or worsening sign and symptoms.  Patient encouraged to call the office with any questions or concerns.     Thank you for allowing me to participate in your care.  Best Regards,    NAHOMI Dallas  [facility] Cardiothoracic Surgery  12/22/22  16:23 EST

## (undated) DEVICE — SHEET, DRAPE, SPLIT, STERILE: Brand: MEDLINE

## (undated) DEVICE — SUT ETHLN 3/0 PSLX 30IN 1683H

## (undated) DEVICE — CANN NASL CO2 DIVIDED A/

## (undated) DEVICE — SINGLE USE SUCTION VALVE MAJ-209: Brand: SINGLE USE SUCTION VALVE (STERILE)

## (undated) DEVICE — PATIENT RETURN ELECTRODE, SINGLE-USE, CONTACT QUALITY MONITORING, ADULT, WITH 9FT CORD, FOR PATIENTS WEIGING OVER 33LBS. (15KG): Brand: MEGADYNE

## (undated) DEVICE — TRAP FLD MINIVAC MEGADYNE 100ML

## (undated) DEVICE — THE BITE BLOCK MAXI, LATEX FREE STRAP IS USED TO PROTECT THE ENDOSCOPE INSERTION TUBE FROM BEING BITTEN BY THE PATIENT.

## (undated) DEVICE — SOL IRR NACL 0.9PCT BT 1000ML

## (undated) DEVICE — DRAPE,TOP,102X53,STERILE: Brand: MEDLINE

## (undated) DEVICE — INTENDED FOR TISSUE SEPARATION, AND OTHER PROCEDURES THAT REQUIRE A SHARP SURGICAL BLADE TO PUNCTURE OR CUT.: Brand: BARD-PARKER ® STAINLESS STEEL BLADES

## (undated) DEVICE — CANNULA,ADULT,SOFT-TOUCH,7'TUBE,UC: Brand: PENDING

## (undated) DEVICE — SUT MNCRYL PLS ANTIB UD 4/0 PS2 18IN

## (undated) DEVICE — BOWL UTIL STRL 32OZ

## (undated) DEVICE — SPEC CONT WIDE MOUTH 3OZ 400/CS 20 CS/SK: Brand: MEDEGEN MEDICAL PRODUCTS, LLC

## (undated) DEVICE — LUER-LOK 360°: Brand: CONNECTA, LUER-LOK

## (undated) DEVICE — DISH PETRI 3.5IN MD STRL LF

## (undated) DEVICE — MEDI-VAC YANKAUER SUCTION HANDLE W/BULBOUS TIP: Brand: CARDINAL HEALTH

## (undated) DEVICE — SIDESTREAM™ HIGH-EFFICIENCY NEBULIZER: Brand: AIRLIFE™

## (undated) DEVICE — ST NDL BRONCH ASP VIZISHOT 2 FLX 19GA

## (undated) DEVICE — SUCTION CANISTER 2500CC: Brand: DEROYAL

## (undated) DEVICE — CAMERA/LASER ARM DRAPE: Brand: DEROYAL

## (undated) DEVICE — INTENDED USE FOR SURGICAL MARKING ON INTACT SKIN, ALSO PROVIDES A PERMANENT METHOD OF IDENTIFYING OBJECTS IN THE OPERATING ROOM: Brand: WRITESITE® REGULAR TIP SKIN MARKER

## (undated) DEVICE — PROB CRYOABL CARD CARDIOBLATE/CRYOFLEX 25TO100MM 10CM 1P/U

## (undated) DEVICE — PREMIUM DRY TRAY LF: Brand: MEDLINE INDUSTRIES, INC.

## (undated) DEVICE — PAD ARMBRD SURG CONVOL 7.5X20X2IN

## (undated) DEVICE — SYR SLPTP 20CC

## (undated) DEVICE — SUT GUT CHRM 3/0 SH 27IN G122H

## (undated) DEVICE — SINGLE USE BIOPSY VALVE MAJ-210: Brand: SINGLE USE BIOPSY VALVE (STERILE)

## (undated) DEVICE — ADHS LIQ MASTISOL 2/3ML

## (undated) DEVICE — SOL LR 1000ML

## (undated) DEVICE — DRSNG SURESITE WNDW 2.38X2.75

## (undated) DEVICE — GOWN,PREVENTION PLUS,XXLARGE,STERILE: Brand: MEDLINE

## (undated) DEVICE — SPNG GZ WOVN 4X4IN 12PLY 10/BX STRL

## (undated) DEVICE — ANTIBACTERIAL UNDYED BRAIDED (POLYGLACTIN 910), SYNTHETIC ABSORBABLE SUTURE: Brand: COATED VICRYL

## (undated) DEVICE — CLN MEGADYNE TP SS

## (undated) DEVICE — PENCL ROCKRSWCH MEGADYNE W/HOLSTR 10FT SS

## (undated) DEVICE — CANNULA,OXY,ADULT,SUPERSOFT,W/7'TUB,UC: Brand: MEDLINE

## (undated) DEVICE — GLV SURG PREMIERPRO MIC LTX PF SZ7 BRN

## (undated) DEVICE — STRIP,CLOSURE,WOUND,MEDI-STRIP,1/2X4: Brand: MEDLINE

## (undated) DEVICE — BLD SCLPL SS NO24 STRL

## (undated) DEVICE — SYR SLP TP 10ML DISP

## (undated) DEVICE — 3M™ MEDIPORE™ H SOFT CLOTH SURGICAL TAPE, 2863, 3 IN X 10 YD, 12/CASE: Brand: 3M™ MEDIPORE™

## (undated) DEVICE — APPL CHLORAPREP TINTED 26ML TEAL

## (undated) DEVICE — ST EXT MICROBORE FIX M LL 38IN

## (undated) DEVICE — Device: Brand: BALLOON

## (undated) DEVICE — TBG PENCL TELESCP MEGADYNE SMOKE EVAC 10FT

## (undated) DEVICE — SYR LL W/SCALE/MARK 3ML STRL

## (undated) DEVICE — AIRWY 90MM NO9

## (undated) DEVICE — MEDI-VAC NON-CONDUCTIVE SUCTION TUBING: Brand: CARDINAL HEALTH

## (undated) DEVICE — TRAP,MUCUS SPECIMEN,40CC: Brand: MEDLINE